# Patient Record
Sex: MALE | URBAN - METROPOLITAN AREA
[De-identification: names, ages, dates, MRNs, and addresses within clinical notes are randomized per-mention and may not be internally consistent; named-entity substitution may affect disease eponyms.]

---

## 2022-02-09 ENCOUNTER — ATHLETIC TRAINING (OUTPATIENT)
Dept: SPORTS MEDICINE | Facility: OTHER | Age: 22
End: 2022-02-09

## 2022-02-09 DIAGNOSIS — M25.332 WRIST JOINT INSTABILITY, LEFT: Primary | ICD-10-CM

## 2022-02-11 NOTE — PROGRESS NOTES
2/9  A: Left Wrist Instability  Pt asked to tape his Left wrist pre-practice and if it does not help he will seek out further care    LB ATC

## 2022-02-17 ENCOUNTER — ATHLETIC TRAINING (OUTPATIENT)
Dept: SPORTS MEDICINE | Facility: OTHER | Age: 22
End: 2022-02-17

## 2022-02-17 DIAGNOSIS — S50.11XA CONTUSION OF RIGHT FOREARM, INITIAL ENCOUNTER: Primary | ICD-10-CM

## 2022-02-17 DIAGNOSIS — S50.11XA CONTUSION OF ELBOW AND FOREARM, RIGHT, INITIAL ENCOUNTER: Primary | ICD-10-CM

## 2022-02-17 NOTE — PROGRESS NOTES
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 today with a c/o pain and swelling of their right forearm  Pt was told to report this morning to follow up after last nights practice  Pt states the RINA was a hit from stick to their right forearm proximal to the elbow  Pt states swelling has gone down from last night  Objective: Obvious discoloration and bruising noted  TTP proximal to the elbow  Pain with wrist flexion  Pt provided with a closed cell foam pad to help protect and absorb possible further hits to the area          Treatment Log       Date: 2/17/22   Playing Status As Tolerated, Limited contact   Tolerated Treatment Well       Exercise/Treatment    CUS 1 0 w/cm at [address]

## 2022-02-18 NOTE — PROGRESS NOTES
AT Daily Progress Note    Subjective: Ath reports to the clinic after practice to receive some treatment for his R forearm  He states that he did not do much but was given a pad for protection during practice  Objective: There is significantly less swelling over this forearm  He is still TTP over the bump  Treatment/Rehab Performed:    PUS 0 6W cm2 6min 3MHz    50x flexion and extension                        Plan: He was given a compression wrap to wear the remainder of the day  He was also told to complete 2 sets of the flexion and extension tonight prior to going to bed with the wrap on  He was told to take the wrap off when going to bed and to have his roommate wrap it on tomorrow morning  He will also check in with one of the AT's tomorrow prior to practice to get it wrapped

## 2022-03-25 ENCOUNTER — ATHLETIC TRAINING (OUTPATIENT)
Dept: SPORTS MEDICINE | Facility: OTHER | Age: 22
End: 2022-03-25

## 2022-03-25 DIAGNOSIS — M75.21 BICEPS TENDINITIS OF RIGHT UPPER EXTREMITY: Primary | ICD-10-CM

## 2022-03-25 NOTE — PROGRESS NOTES
Athletic Training Initial Evaluation    Assessment/Diagnosis: Bicep Tendinitis    Subjective: Ath reports to the clinic today c/o R shoulder pain  He states that he was hit on the edge of his shoulder during the last game and it has been bothering him since  He denies hearing any pop/snap  He also denies any numbness or tingling into his arm or hand/fingers  He states that it has been bothering him recently to move his arm  Yesterday he was not able to, but today he is able to move his arm  This is his dominant arm  He denies any other injury to that shoulder  Objective:     VI: (+) swelling over the ACJ  Slight elevation of R shoulder  (-) discoloration, deformity  Palpation: TTP over the proximal bicep tendon  ROM: Slight discomfort with forward flexion and ABD  MMT: WNL  Forward flexion 4-/5  ST: (+) speeds  (-) labral Shara eldridge,     Treatment/Rehab Performed:    PUS 0 8W/cm2 at 20% for 6min                            Plan: He will practice as tolerated with a shoulder spica  He will make appointments to begin treatment/rehab

## 2022-03-27 ENCOUNTER — ATHLETIC TRAINING (OUTPATIENT)
Dept: SPORTS MEDICINE | Facility: OTHER | Age: 22
End: 2022-03-27

## 2022-03-27 DIAGNOSIS — M75.21 BICEPS TENDINITIS OF RIGHT UPPER EXTREMITY: Primary | ICD-10-CM

## 2022-03-27 NOTE — PROGRESS NOTES
AT Daily Progress Note    Assessment/Diagnosis: Bicep Tendinitis    Subjective: Ath reports to the clinic today to cont treatment for his R shoulder  He states that the pain is a little more today since he played a lot in the game yesterday  He states that the shoulder wrap neither helped nor hindered him  Objective: No new measures at this time  Treatment/Rehab Performed:    CUS 0 9W/cm2 for 6min    Cupping of bicep tendon and upper trap                        Plan: He states that he felt really loose after the cupping and the cupping on the bicep felt interesting  He will cont to make appointments for rehab and treatment

## 2022-03-30 ENCOUNTER — ATHLETIC TRAINING (OUTPATIENT)
Dept: SPORTS MEDICINE | Facility: OTHER | Age: 22
End: 2022-03-30

## 2022-03-30 DIAGNOSIS — M75.21 BICEPS TENDINITIS OF RIGHT UPPER EXTREMITY: Primary | ICD-10-CM

## 2022-03-30 NOTE — PROGRESS NOTES
Athletic Training Progress Note    Name: Reza Hawthorne  Age: 25 y o  Assessment/Plan:     Visit Diagnosis: Biceps tendinitis of right upper extremity [M75 21]    Treatment Plan: See treatment log below  Work on strengthening of the shoulder musculature  []  Follow-up PRN  []  Follow-up prior to next practice/game for re-evaluation  [x]  Daily treatment/rehab  Progress note expected weekly  Subjective: Ath reports to the clinic today for treatment prior to the game  He states that his shoulder feels better than yesterday  He states that CUS helps him  Objective: Still has visible bruising from cupping on the bicep tendon  Treatment Log:     Date: 3/30/22   Playing Status: As tolerated       Exercise/Treatment    CUS 0 6W/cm2 for 6min Completed                                              He will cont to make appointments for treatment and rehab

## 2022-03-31 ENCOUNTER — ATHLETIC TRAINING (OUTPATIENT)
Dept: SPORTS MEDICINE | Facility: OTHER | Age: 22
End: 2022-03-31

## 2022-03-31 DIAGNOSIS — M75.21 BICEPS TENDINITIS OF RIGHT UPPER EXTREMITY: Primary | ICD-10-CM

## 2022-03-31 NOTE — PROGRESS NOTES
Athletic Training Progress Note    Name: Khadijah Moncada  Age: 25 y o  Assessment/Plan:     Visit Diagnosis: Biceps tendinitis of right upper extremity [M75 21]    Treatment Plan: See below    []  Follow-up PRN  []  Follow-up prior to next practice/game for re-evaluation  [x]  Daily treatment/rehab  Progress note expected weekly  Subjective: He states that it has hurt in the morning but it got better as the day has gone on  He states that he believes what we have been doing is helpful    Objective: No new objective measures at this time    Treatment Log:     Date: 3/31/22 3/30/22   Playing Status: As tolerated As tolerated        Exercise/Treatment     CUS 0 9W/cm2 for 6min Completed Completed   IR and ER with black band  2x10    Forward flexion black band 2x10    Shoulder Extension black band 2x10    Wall clock green ball 3x both ways    Shoulder ABD w/ 10# 2x10                                He will cont to make appointments for treatment and rehab

## 2022-04-05 ENCOUNTER — ATHLETIC TRAINING (OUTPATIENT)
Dept: SPORTS MEDICINE | Facility: OTHER | Age: 22
End: 2022-04-05

## 2022-04-05 DIAGNOSIS — M75.21 BICEPS TENDINITIS OF RIGHT UPPER EXTREMITY: Primary | ICD-10-CM

## 2022-04-06 NOTE — PROGRESS NOTES
Athletic Training Progress Note    Name: Zenaida Jaffe  Age: 25 y o  Assessment/Plan:     Visit Diagnosis: Biceps tendinitis of right upper extremity [M75 21]    Treatment Plan:    []  Follow-up PRN  []  Follow-up prior to next practice/game for re-evaluation  [x]  Daily treatment/rehab  Progress note expected weekly  Subjective: States completing rehabilitation made his shoulder feel better during his practice and he thinks it is helping him  Objective:   No new objective measures      Treatment Log:     Date: 4/5/22       Playing Status: Full Go               Exercise/Treatment        CUS  5 mins       Shoulder Flexion  2x10       Shoulder Extension 2x10       External/Internal Rotation 2x10       Isometric Shoulder Abduction 2x10       Wall Clock  W/ green ball

## 2022-04-07 ENCOUNTER — ATHLETIC TRAINING (OUTPATIENT)
Dept: SPORTS MEDICINE | Facility: OTHER | Age: 22
End: 2022-04-07

## 2022-04-07 DIAGNOSIS — M75.21 BICEPS TENDINITIS OF RIGHT UPPER EXTREMITY: Primary | ICD-10-CM

## 2022-04-07 NOTE — PROGRESS NOTES
Athletic Training Progress Note    Name: Tavia Crum  Age: 25 y o  Assessment/Plan:     Visit Diagnosis: Biceps tendinitis of right upper extremity [M75 21]    Treatment Plan: See below    []  Follow-up PRN  []  Follow-up prior to next practice/game for re-evaluation  [x]  Daily treatment/rehab  Progress note expected weekly  Subjective: Ath reports today for cont treatment for his R shoulder  He states that it has been bothering him a little the past couple of days after playing in games and getting hit  He states that he believe the exercises and CUS do help    Objective: Has some ecchymosis from repetitive checks during practice and games    Treatment Log:     Date: 4/7/22 3/31/22 3/30/22   Playing Status: As tolerated As tolerated As tolerated         Exercise/Treatment      CUS 0 9W/cm2 for 6min completed Completed Completed   IR and ER with black band  - 2x10    Forward flexion black band - 2x10    Shoulder Extension black band - 2x10    Wall clock green ball 3x both ways (did black ball) 3x both ways    Shoulder ABD w/ 10#  2x10    Body blade in 90/90 3x30s     Body blade in IR 3x45s     Ball throw against trampoleen 2x15                    He will cont to make appointments to cont treatment/rehab

## 2022-04-11 ENCOUNTER — ATHLETIC TRAINING (OUTPATIENT)
Dept: SPORTS MEDICINE | Facility: OTHER | Age: 22
End: 2022-04-11

## 2022-04-11 DIAGNOSIS — M75.21 BICEPS TENDINITIS OF RIGHT UPPER EXTREMITY: Primary | ICD-10-CM

## 2022-04-11 NOTE — PROGRESS NOTES
Athletic Training Progress Note    Name: Eleuterio Dakins  Age: 25 y o  Assessment/Plan:     Visit Diagnosis: No primary diagnosis found  Treatment Plan: See below    []  Follow-up PRN  []  Follow-up prior to next practice/game for re-evaluation  [x]  Daily treatment/rehab  Progress note expected weekly  Subjective: Ath reports today for cont treatment for his R shoulder  He states that it has been bothering him a little the past couple of days after playing in games and getting hit  He states that he believe the exercises and CUS do help  Objective: Has some ecchymosis from repetitive checks during practice and games    Treatment Log:     Date: 4/11/22 4/7/22 3/31/22 3/30/22   Playing Status: Full go As tolerated As tolerated As tolerated          Exercise/Treatment       CUS 0 9W/cm2 for 6min completed completed Completed Completed   IR and ER with black band   - 2x10    Forward flexion black band  - 2x10    Shoulder Extension black band  - 2x10    Wall clock green ball 3x both ways (black) 3x both ways (did black ball) 3x both ways    Shoulder ABD w/ 10#   2x10    Body blade in 90/90 2x30 3x30s     Body blade in IR 2x30 3x45s     Ball throw against trampoleen  2x15                      He will cont to make appointments to cont treatment/rehab  He was told that it will take longer to completely heal since he is still actively playing

## 2022-04-12 ENCOUNTER — ATHLETIC TRAINING (OUTPATIENT)
Dept: SPORTS MEDICINE | Facility: OTHER | Age: 22
End: 2022-04-12

## 2022-04-12 DIAGNOSIS — M75.21 BICEPS TENDINITIS OF RIGHT UPPER EXTREMITY: Primary | ICD-10-CM

## 2022-04-12 NOTE — PROGRESS NOTES
Athletic Training Progress Note    Name: Adeline Finley  Age: 25 y o  Assessment/Plan:     Visit Diagnosis: Biceps tendinitis of right upper extremity [M75 21]    Treatment Plan: See below    []  Follow-up PRN  []  Follow-up prior to next practice/game for re-evaluation  [x]  Daily treatment/rehab  Progress note expected weekly  Subjective: Ath reports today for cont treatment for his R shoulder  He states that it has been bothering him when he wakes up in the morning but it continues to feel better each day  He states that he believe the exercises and CUS do help and he feels better once leaving the [address] [place] [address]  Objective: Has some ecchymosis from repetitive checks during practice and games    Treatment Log:     Date: 4/12/22 4/11/22 4/7/22 3/31/22 3/30/22   Playing Status: Full go Full go As tolerated As tolerated As tolerated           Exercise/Treatment        CUS 0 9W/cm2 for 6min completed 1 3W/cm2  completed completed Completed Completed   IR and ER with black band    - 2x10    Forward flexion black band   - 2x10    Shoulder Extension black band   - 2x10    Wall clock green ball 3x both ways (black) 3x both ways (black) 3x both ways (did black ball) 3x both ways    Shoulder ABD w/ 10#    2x10    Body blade in 90/90 2x30 2x30 3x30s     Body blade in IR 2x30 2x30 3x45s     Ball throw against trampoleen 2x10  2x15     Body blade d2 pattern 2x10                  He will cont to make appointments to cont treatment/rehab   He has no restrictions for practice    Hue Engel ATS    Reviewed by Rhoda Fischer, MS, LAT, ATC, EMT

## 2022-04-18 ENCOUNTER — ATHLETIC TRAINING (OUTPATIENT)
Dept: SPORTS MEDICINE | Facility: OTHER | Age: 22
End: 2022-04-18

## 2022-04-18 DIAGNOSIS — M75.21 BICEPS TENDINITIS OF RIGHT UPPER EXTREMITY: Primary | ICD-10-CM

## 2022-04-18 NOTE — PROGRESS NOTES
Athletic Training Progress Note    Name: Guille Swanson  Age: 25 y o  Assessment/Plan:     Visit Diagnosis: Biceps tendinitis of right upper extremity [M75 21]    Treatment Plan: See below    []  Follow-up PRN  []  Follow-up prior to next practice/game for re-evaluation  [x]  Daily treatment/rehab  Progress note expected weekly  Subjective: Ath reports today for cont treatment for his R shoulder  He states that it has been bothering him when he wakes up in the morning but it continues to feel better each day  He states that he believe the exercises and CUS do help and he feels better once leaving the [facility]  During break he took some time off  He states that when he did wall ball over break it flared up a little bit  Objective: Has some ecchymosis from repetitive checks during practice and games    Treatment Log:     Date: 4/18/22 4/12/22 4/11/22 4/7/22 3/31/22 3/30/22   Playing Status: Full go Full go Full go As tolerated As tolerated As tolerated            Exercise/Treatment         CUS 0 9W/cm2 for 6min Completed 1 4W/cm2 completed 1 3W/cm2  completed completed Completed Completed   IR and ER with black band     - 2x10    Forward flexion black band    - 2x10    Shoulder Extension black band    - 2x10    Wall clock green ball 3 ways both way 3x both ways (black) 3x both ways (black) 3x both ways (did black ball) 3x both ways    Shoulder ABD w/ 10#     2x10    Body blade in 90/90 2x30 2x30 2x30 3x30s     Body blade in IR 2x30 2x30 2x30 3x45s     Ball throw against trampoleen  2x10  2x15     Body blade d2 pattern 2x30 2x10                   He will cont to make appointments to cont treatment/rehab   He has no restrictions for practice

## 2022-04-22 ENCOUNTER — ATHLETIC TRAINING (OUTPATIENT)
Dept: SPORTS MEDICINE | Facility: OTHER | Age: 22
End: 2022-04-22

## 2022-04-22 DIAGNOSIS — M67.921 TENDINOPATHY OF RIGHT BICEPS TENDON: Primary | ICD-10-CM

## 2022-04-22 NOTE — PROGRESS NOTES
Athletic Training Progress Note    Name: Roger Sanders  Age: 25 y o  Assessment/Plan:     Visit Diagnosis: Tendinopathy of right biceps tendon [T88 694]    Treatment Plan: See below    []  Follow-up PRN  []  Follow-up prior to next practice/game for re-evaluation  [x]  Daily treatment/rehab  Progress note expected weekly  Subjective: Ath reports today for cont treatment for his R shoulder  He states that it feels better now that he has been coming in more often  He still has a little discomfort still but nothing that is bad  Objective: No new observations at this time    Treatment Log:     Date: 4/22/22 4/18/22 4/12/22 4/11/22 4/7/22 3/31/22 3/30/22   Playing Status: Full go Full go Full go Full go As tolerated As tolerated As tolerated             Exercise/Treatment          CUS 1 4W/cm2 for 6min completed Completed 1 4W/cm2 completed 1 3W/cm2  completed completed Completed Completed   IR and ER with black band      - 2x10    Forward flexion black band     - 2x10    Shoulder Extension black band     - 2x10    Wall clock black ball 3x each way 3 ways both way 3x both ways (black) 3x both ways (black) 3x both ways (did black ball) 3x both ways    Shoulder ABD w/ 10#      2x10    Body blade in 90/90 Completed 2x30 2x30 2x30 3x30s     Body blade in IR completed 2x30 2x30 2x30 3x45s     Ball throw against trampoleen   2x10  2x15     Body blade d2 pattern Completed 2x30 2x10       Blaze pods 3x            He will cont to make appointments to cont treatment/rehab   He has no restrictions for practice

## 2022-04-25 ENCOUNTER — ATHLETIC TRAINING (OUTPATIENT)
Dept: SPORTS MEDICINE | Facility: OTHER | Age: 22
End: 2022-04-25

## 2022-04-25 DIAGNOSIS — M67.921 TENDINOPATHY OF RIGHT BICEPS TENDON: Primary | ICD-10-CM

## 2022-04-25 NOTE — PROGRESS NOTES
Athletic Training Progress Note    Name: Radha Montana  Age: 25 y o  Assessment/Plan:     Visit Diagnosis: Tendinopathy of right biceps tendon [J21 705]    Treatment Plan: See below    []  Follow-up PRN  []  Follow-up prior to next practice/game for re-evaluation  [x]  Daily treatment/rehab  Progress note expected weekly  Subjective: Ath reports today for cont treatment for his R shoulder  He states that it feels better now that he has been coming in more often  He states that he feels that what we have been doing helps and says that it "hasn't gotten any worse "    Objective: No new observations at this time    Treatment Log:     Date: 4/25/22 4/22/22 4/18/22 4/12/22 4/11/22 4/7/22 3/31/22 3/30/22   Playing Status: Full go Full go Full go Full go Full go As tolerated As tolerated As tolerated              Exercise/Treatment           CUS 1 4W/cm2 for 6min Completed (0 9, 8min for pain) completed Completed 1 4W/cm2 completed 1 3W/cm2  completed completed Completed Completed   IR and ER with black band       - 2x10    Forward flexion black band 2x10     - 2x10    Shoulder Extension black band 2x10     - 2x10    Wall clock black ball  3x each way 3 ways both way 3x both ways (black) 3x both ways (black) 3x both ways (did black ball) 3x both ways    Shoulder ABD w/ 10#       2x10    Body blade in 90/90 completed Completed 2x30 2x30 2x30 3x30s     Body blade in IR completed completed 2x30 2x30 2x30 3x45s     Ball throw against trampoleen 2x15   2x10  2x15     Body blade d2 pattern completed Completed 2x30 2x10       Blaze pods 2x 3x         Horizontal ADD black band 2x10          Horizontal flexion at 90 w/ black band 2x10             He will cont to make appointments to cont treatment/rehab   He has no restrictions for practice

## 2022-04-28 ENCOUNTER — ATHLETIC TRAINING (OUTPATIENT)
Dept: SPORTS MEDICINE | Facility: OTHER | Age: 22
End: 2022-04-28

## 2022-04-28 DIAGNOSIS — S83.203A OTHER TEAR OF MENISCUS OF RIGHT KNEE AS CURRENT INJURY, UNSPECIFIED MENISCUS, INITIAL ENCOUNTER: Primary | ICD-10-CM

## 2022-04-29 ENCOUNTER — ATHLETIC TRAINING (OUTPATIENT)
Dept: SPORTS MEDICINE | Facility: OTHER | Age: 22
End: 2022-04-29

## 2022-04-29 DIAGNOSIS — S83.281D ACUTE LATERAL MENISCUS TEAR OF RIGHT KNEE, SUBSEQUENT ENCOUNTER: Primary | ICD-10-CM

## 2022-04-29 NOTE — PROGRESS NOTES
Athletic Training Knee Evaluation    Name: Luis Delacruz  Age: 25 y o    School District: [address] [place] [address] Road  Sport: [employer]  Date of Assessment: 4/28/2022    Assessment/Plan:     Visit Diagnosis: Other tear of meniscus of right knee as current injury, unspecified meniscus, initial encounter [I34 855X]    Treatment Plan: See below  Treat conservatively for 2 weeks and look for improvement  Work on [employer] and quad sets for extension  Avoid deep squatting until pain is tolerable  []  Follow-up PRN  []  Follow-up prior to next practice/game for re-evaluation  [x]  Daily treatment/rehab  Progress note expected weekly  Referral:     [x]  Not needed at this time/may follow up with team doctor next tuesday  []  Referred to:     [x]  Coaching staff notified  []  Parent/Guardian Notified    Subjective:    Date of Injury: 4/29/22    Injury occurred during:     []  Practice  [x]  Competition  []  Other:     Mechanism: Does not recall a specific RINA    Previous History: No PMHx noted at this time    Reported Symptoms:     [] Felt pop [] Grinding   [] Sandy Crumbly a pop [x] Pressure   [x] Pain with rest [] Burning   [x] Pain with activity [x] Weakness   [x] Pain with stairs [x] Loss of motion   [x] Sharp pain [] Clicking   [] Dull pain [] Snapping sensation   [] Radiating pain [] Locking   [] Felt give way       Objective:    Observation:     []  No observable findings compared bilaterally    [x] Swelling [] Genu recurvatum   [x] Effusion [] Genu valgum   [] Deformity [] Genu varus   [] Ecchymosis [] Patella reece   [x] Abnormal gait: walks with a limp [] Patella baja   [] Atrophy [] Squinting patellae   [] Muscle spasm [] Frog eye patellae     Palpation: TTP over the posterolateral joint line  TTP over the Semimembranosus and Semitendinosus   Also TTP in the popliteal fossa    Active Range of Motion: Lacking last 5 degrees of knee extension     Full  ROM Limited  ROM Pain  with  ROM No  Motion   Knee Flexion [] [] [x] []   Knee Extension [] [x] [x] []   Hip Flexion [x] [] [] []   Hip Extension [x] [] [] []   Hip Abduction [x] [] [] []   Hip Adduction [x] [] [] []   Dorsiflexion [x] [] [] []   Plantarflexion [x] [] [] []     Manual Muscle Tests:      Not performed []             5 4+ 4 4- 3 or  Under   Knee Flexion [] [x] [] [] []   Knee Extension [] [] [x] [] []   Hip Flexion [] [] [] [] []   Hip Extension [] [] [] [] []   Hip Abduction [] [] [] [] []   Hip Adduction [] [] [] [] []   Dorsiflexion [] [] [] [] []   Plantarflexion [] [] [] [] []     Special Tests:      (+)  Laxity (+)  Pain (-)  WNL Not  Tested   Lachman [] [] [x] []   Anterior Drawer [] [] [x] []   Pivot Shift [] [] [] []   Posterior Drawer [] [] [x] []   Sag [] [] [] []   Valgus (0 Degrees) [] [] [x] []   Valgus (30 Degrees) [] [] [x] []   Varus (0 Degrees) [] [] [x] []   Varus (30 Degrees) [] [] [x] []   Bianka [] [] [] []   Thessally's [] [x] [] []   Apley's [] [x] [] []   Jeferson's [] [] [] []   Patellar Apprehension [] [] [] []   Patellar Glide [] [] [] []   Ballotable Patella  [] [] [] []     Treatment Log:     Date: 4/28/22   Playing Status: Out       Exercise/Treatment    Ice bag completed                                              Ath will be held from practice  Talked about treating conservatively at first and if no improvement then discuss seeing the team physician for other options  Was told to prepare to not play in the game on Saturday due to his pain level  He was given a compression sleeve and told to wear it during the day and to take it off to sleep and to shower  He was also given tylenol for the pain as he only had ibuprofen  He was educated on the importance of the inflammation process and to not take any NSAIDs  He will make an appointment for tomorrow to begin rehab

## 2022-04-29 NOTE — PROGRESS NOTES
Athletic Training Progress Note    Name: Abdirahman Stone  Age: 25 y o  Assessment/Plan:     Visit Diagnosis: Acute lateral meniscus tear of right knee, subsequent encounter [G46 553E]    Treatment Plan: See below  Began ROM exercises today  Cont to progress as tolerated  []  Follow-up PRN  []  Follow-up prior to next practice/game for re-evaluation  [x]  Daily treatment/rehab  Progress note expected weekly  Subjective: Ath reports to the clinic today to begin rehab for his R knee  He states that the pain is less than it was yesterday but it still bothers him  He states that it has clicked a few times since yesterdays appointments  Objective: He is still TTP to the posterolateral joint line and posterior horn of the meniscus  (+) Felyeys for pain in flexion, Valgus stress test for my hand placement on the lateral aspect of the knee  Treatment Log:     Date: 4/29/22 4/28/22   Playing Status: Out Out        Exercise/Treatment     Ice bag  completed   PUS, 5min, 20%, 0 5 in popliteal fossa     Quad sets 50x    Heels slides 50x    Thermx 10min                                          He states that the heel slides bother him when he goes to straighten his knee  He will cont to make appointments for rehab

## 2022-05-01 ENCOUNTER — ATHLETIC TRAINING (OUTPATIENT)
Dept: SPORTS MEDICINE | Facility: OTHER | Age: 22
End: 2022-05-01

## 2022-05-01 DIAGNOSIS — S83.281D ACUTE LATERAL MENISCUS TEAR OF RIGHT KNEE, SUBSEQUENT ENCOUNTER: Primary | ICD-10-CM

## 2022-05-01 NOTE — PROGRESS NOTES
Athletic Training Progress Note    Name: Pavan Helton  Age: 25 y o  Assessment/Plan:     Visit Diagnosis: Acute lateral meniscus tear of right knee, subsequent encounter [Q24 536I]    Treatment Plan: See below  Began ROM exercises today  Cont to progress as tolerated  []  Follow-up PRN  []  Follow-up prior to next practice/game for re-evaluation  [x]  Daily treatment/rehab  Progress note expected weekly  Subjective: Ath reports to the clinic today to begin rehab for his R knee  He went to the game yesterday and stood ofr the majority of the time  He states that today he feels even better  He states that going down stairs is bothering him, but not like it has  He states that he uses the crutches 75% of the time when he is going around campus  Objective: No new objective measures at this time  Treatment Log:     Date: 5/1/22 4/29/22 4/28/22   Playing Status: Out Out Out         Exercise/Treatment      Ice bag   completed   PUS, 5min, 20%, 0 5 in popliteal fossa      Quad sets 25x 50x    Heels slides 25x 50x    Thermx completed 10min    CUS, 1 0, 3MHz, 7min completed     Weight shifts 25x     Laps around clinic 6x                                He states that the heel slides bother him when he goes to straighten his knee  He will cont to make appointments for rehab

## 2022-05-02 ENCOUNTER — ATHLETIC TRAINING (OUTPATIENT)
Dept: SPORTS MEDICINE | Facility: OTHER | Age: 22
End: 2022-05-02

## 2022-05-02 DIAGNOSIS — S83.281D ACUTE LATERAL MENISCUS TEAR OF RIGHT KNEE, SUBSEQUENT ENCOUNTER: Primary | ICD-10-CM

## 2022-05-02 NOTE — PROGRESS NOTES
Athletic Training Progress Note    Name: Robby Botello  Age: 25 y o  Assessment/Plan:     Visit Diagnosis: Acute lateral meniscus tear of right knee, subsequent encounter [J70 875D]    Treatment Plan: See below  Began ROM exercises today  Cont to progress as tolerated  []  Follow-up PRN  []  Follow-up prior to next practice/game for re-evaluation  [x]  Daily treatment/rehab  Progress note expected weekly  Subjective: Ath reports to the clinic today to begin rehab for his R knee  He states that today it feels a lot better and he is able to walk on it  He states that he is able to extend it fully without any real pain  He states that when he goes to pull it out of extension it bothers him a little bit  He states that he feels overall weak  Objective: Appears to have full ROM    Treatment Log:     Date: 5/2/22 5/1/22 4/29/22 4/28/22   Playing Status: Out Out Out Out          Exercise/Treatment       Ice bag    completed   PUS, 5min, 20%, 0 5 in popliteal fossa       Quad sets  25x 50x    Heels slides  25x 50x    Thermx  completed 10min    CUS, 1 0, 3MHz, 7min  completed     Weight shifts  25x     Laps around clinic  6x     Bike 10min      BFR mini squats, LAQ, SLR 30, 15, 15, 15 with 30s break after full set      SLB 2x30s                During the bike, he states that pulling up bothered him when he was pulling up on the peddle  He will cont to make appointments to cont rehab and treatment

## 2022-05-03 ENCOUNTER — ATHLETIC TRAINING (OUTPATIENT)
Dept: SPORTS MEDICINE | Facility: OTHER | Age: 22
End: 2022-05-03

## 2022-05-03 DIAGNOSIS — S83.281D ACUTE LATERAL MENISCUS TEAR OF RIGHT KNEE, SUBSEQUENT ENCOUNTER: Primary | ICD-10-CM

## 2022-05-03 NOTE — PROGRESS NOTES
Athletic Training Progress Note    Name: Yahaira Seymour  Age: 25 y o  Assessment/Plan:     Visit Diagnosis: Acute lateral meniscus tear of right knee, subsequent encounter [R60 677G]    Treatment Plan: See below  Began more functional activity  []  Follow-up PRN  []  Follow-up prior to next practice/game for re-evaluation  [x]  Daily treatment/rehab  Progress note expected weekly  Subjective: Ath reports to the clinic today to begin rehab for his R knee  He states that he is getting better each day  Objective: Appears to have full ROM    Treatment Log:     Date: 5/3/22 5/2/22 5/1/22 4/29/22 4/28/22   Playing Status: Out Out Out Out Out           Exercise/Treatment        Ice bag     completed   PUS, 5min, 20%, 0 5 in popliteal fossa        Quad sets   25x 50x    Heels slides   25x 50x    Thermx   completed 10min    CUS, 1 0, 3MHz, 7min   completed     Weight shifts   25x     Laps around clinic   6x     Bike 10min 10min      BFR Completed squat, lateral slides, bridges (reps as before) 30, 15, 15, 15 with 30s break after full set      SLB 2x45s on blue pad 2x30s                 Saw the doctor today and stated that since he has made good progress to cont with the rehab plan   He will cont to make appointments

## 2022-05-06 ENCOUNTER — ATHLETIC TRAINING (OUTPATIENT)
Dept: SPORTS MEDICINE | Facility: OTHER | Age: 22
End: 2022-05-06

## 2022-05-06 DIAGNOSIS — S89.91XD: Primary | ICD-10-CM

## 2022-05-06 NOTE — PROGRESS NOTES
Athletic Training Progress Note    Name: Laney Oh  Age: 25 y o  Assessment/Plan:     Visit Diagnosis: Knee joint injury, right, subsequent encounter [R37 [id number]]    Treatment Plan: See below  Began more functional activity  []  Follow-up PRN  []  Follow-up prior to next practice/game for re-evaluation  [x]  Daily treatment/rehab  Progress note expected weekly  Subjective: Ath reports to the clinic today to begin rehab for his R knee  He states that he is getting better each day  Objective: Appears to have full ROM    Treatment Log:     Date: 5/6/22 5/3/22 5/2/22 5/1/22 4/29/22 4/28/22   Playing Status: out Out Out Out Out Out            Exercise/Treatment         Ice bag      completed   PUS, 5min, 20%, 0 5 in popliteal fossa         Quad sets    25x 50x    Heels slides    25x 50x    Thermx    completed 10min    CUS, 1 0, 3MHz, 7min    completed     Weight shifts    25x     Laps around clinic    6x     Bike 10min 10min 10min      BFR  Completed squat, lateral slides, bridges (reps as before) 30, 15, 15, 15 with 30s break after full set      SLB  2x45s on blue pad 2x30s      Wall squats with 5s sit 2x8        Nordic hamstring curls 2x5        Normatec completed                    He was able to complete leg day yesterday without any issue but was really sore  The team AT will work on a HEP for him when he leaves for summer  He will make a few more appointments prior to leaving  Assess functionality at next appointment

## 2022-09-13 ENCOUNTER — ATHLETIC TRAINING (OUTPATIENT)
Dept: SPORTS MEDICINE | Facility: OTHER | Age: 22
End: 2022-09-13

## 2022-09-13 DIAGNOSIS — M25.561 RIGHT KNEE PAIN, UNSPECIFIED CHRONICITY: ICD-10-CM

## 2022-09-13 DIAGNOSIS — S76.011A STRAIN OF HIP ADDUCTOR MUSCLE, RIGHT, INITIAL ENCOUNTER: Primary | ICD-10-CM

## 2022-09-14 NOTE — PROGRESS NOTES
Athletic Training Initial Note    Name: Khadijah Moncada  Age: 25 y o  Assessment/Plan:     Visit Diagnosis: Strain of hip adductor muscle, right, initial encounter [S76 011A]    Treatment Plan: Work on strengthening and ROM, as well as pain modulation    []  Follow-up PRN  []  Follow-up prior to next practice/game for re-evaluation  [x]  Daily treatment/rehab  Progress note expected weekly  Subjective: Ath reports to the clinic to have his R knee checked out  He states that it has been bothering him for awhile, especially since getting back on campus  He states that he has increased the intensity and load of his workouts since he has been on campus  He also denies any specific RINA  He states that it hurts the most when he does a side lunge  He points directly to the inside of his knee  Objective:   TTP around the medial epicondyle of the femur  Discomfort with hip ABD  MMT showed 5/5 but pain with ABD  All special tests were negative  Treatment Log:     Date: 9/14/22   Playing Status: As tolerated       Exercise/Treatment    MHP 10min   Side lunge gentle stretch 2x10   Lateral lunges along line 5x   Slide board 2x30                                   He will cont to make appointments for rehab

## 2022-09-28 ENCOUNTER — ATHLETIC TRAINING (OUTPATIENT)
Dept: SPORTS MEDICINE | Facility: OTHER | Age: 22
End: 2022-09-28

## 2022-09-28 DIAGNOSIS — S80.01XA CONTUSION OF RIGHT KNEE, INITIAL ENCOUNTER: Primary | ICD-10-CM

## 2022-09-28 NOTE — PROGRESS NOTES
Athletic Training Knee Evaluation    Name: Rod Rios  Age: 25 y o    School District: [employer]   Sport: Soccer  Date of Assessment: 9/28/2022    Assessment/Plan:     Visit Diagnosis: Contusion of right knee, initial encounter [S80 01XA]    Treatment Plan: Pain modulation    [x]  Follow-up PRN  []  Follow-up prior to next practice/game for re-evaluation  []  Daily treatment/rehab  Progress note expected weekly       Referral:     [x]  Not needed at this time  []  Referred to:     [x]  Coaching staff notified  []  Parent/Guardian Notified    Subjective:    Date of Injury: 9/28/22    Injury occurred during:     [x]  Practice  []  Competition  []  Other:     Mechanism: contact with another player    Previous History: Meniscus injury    Reported Symptoms:     [] Felt pop [] Grinding   [] Dolores Angela a pop [] Pressure   [] Pain with rest [] Burning   [] Pain with activity [] Weakness   [x] Pain with stairs [] Loss of motion   [x] Sharp pain [] Clicking   [] Dull pain [] Snapping sensation   [] Radiating pain [] Locking   [] Felt give way       Objective:    Observation:     []  No observable findings compared bilaterally    [x] Swelling [] Genu recurvatum   [] Effusion [] Genu valgum   [] Deformity [] Genu varus   [] Ecchymosis [] Patella reece   [] Abnormal gait [] Patella baja   [] Atrophy [] Squinting patellae   [] Muscle spasm [] Frog eye patellae     Palpation: TTP over the lateral aspect of the tibia plateua    Active Range of Motion:      Full  ROM Limited  ROM Pain  with  ROM No  Motion   Knee Flexion [x] [] [x] []   Knee Extension [x] [] [] []   Hip Flexion [] [] [] []   Hip Extension [] [] [] []   Hip Abduction [] [] [] []   Hip Adduction [] [] [] []   Dorsiflexion [] [] [] []   Plantarflexion [] [] [] []     Manual Muscle Tests:     Not performed [x]             5 4+ 4 4- 3 or  Under   Knee Flexion [] [] [] [] []   Knee Extension [] [] [] [] []   Hip Flexion [] [] [] [] []   Hip Extension [] [] [] [] []   Hip Abduction [] [] [] [] []   Hip Adduction [] [] [] [] []   Dorsiflexion [] [] [] [] []   Plantarflexion [] [] [] [] []     Special Tests:      (+)  Laxity (+)  Pain (-)  WNL Not  Tested   Lachman [] [] [x] []   Anterior Drawer [] [] [x] []   Pivot Shift [] [] [] []   Posterior Drawer [] [] [x] []   Sag [] [] [] []   Valgus (0 Degrees) [] [] [x] []   Valgus (30 Degrees) [] [] [x] []   Varus (0 Degrees) [] [] [x] []   Varus (30 Degrees) [] [] [x] []   Bianka [] [] [x] []   Thessjudah's [] [] [] []   Apley's [] [] [] []   Jeferson's [] [] [] []   Patellar Apprehension [] [] [] []   Patellar Glide [] [] [] []   Ballotable Patella  [] [] [] []     Treatment Log:      He will cont to ice and follow up prn

## 2022-11-28 ENCOUNTER — ATHLETIC TRAINING (OUTPATIENT)
Dept: SPORTS MEDICINE | Facility: OTHER | Age: 22
End: 2022-11-28

## 2022-11-28 DIAGNOSIS — M62.838 MUSCLE SPASM OF RIGHT SHOULDER: Primary | ICD-10-CM

## 2022-11-28 NOTE — PROGRESS NOTES
Athletic Training Shoulder Evaluation    Name: Roger Sanders  Age: 23 y o    School District: [address] [place] [address] Road  Sport: René Vivar  Date of Assessment: 11/28/2022    Assessment/Plan:     Visit Diagnosis: Muscle spasm of right shoulder [M62 838]    Treatment Plan: Work on muscle spasm    [x]  Follow-up PRN  []  Follow-up prior to next practice/game for re-evaluation  []  Daily treatment/rehab  Progress note expected weekly  Referral:     [x]  Not needed at this time  []  Referred to:     []  Coaching staff notified  []  Parent/Guardian Notified    Subjective:    Date of Injury:  11/23/22    Injury occurred during:     []  Practice  []  Competition  [x]  Other: [employer] game    Mechanism: Tackled from behind  Previous History: bicep tendinitis    Reported Symptoms:     [] Felt/heard a pop [] Pressure   [] Pain with rest [] Locking   [] Pain with activity [] Burning   [] Pain with overhead activity [] Weakness   [] Paresthesia [] Loss of motion   [] Sharp pain [] Crepitus   [x] Dull pain [] Clicking   [] Radiating pain [] Popping sensation   [] Felt give way [] Snapping sensation   [] [address] [] Cervical pain     Objective:    Observation:     []  No observable findings compared bilaterally    [] Swelling [] Asymmetry (in motion)   [] Ecchymosis [] Winged scapula   [] Deformity [] Scapular dyskinesis   [] Atrophy [] Uneven shoulders   [x] Muscle spasm [] Spine curvature     Palpation: Spasm over the supraspinatus and upper trap region      Active Range of Motion:      Full  ROM Limited  ROM Pain  with  ROM No  Motion   Shoulder Flexion [x] [] [] []   Shoulder Extension [x] [] [] []   Shoulder Abduction [x] [] [] []   Shoulder Adduction [x] [] [] []   Horizontal Abduction [x] [] [] []   Horizontal Adduction [x] [] [] []   Internal Rotation  [x] [] [] []   Internal Rotation  [x] [] [] []   Scapular Retraction  [] [] [] []   Scapular Protraction [] [] [] []     Manual Muscle Tests:     Not performed []             5 4+ 4 4- 3 or  Under   Shoulder Flexion [x] [] [] [] []   Shoulder Extension [x] [] [] [] []   Shoulder Abduction [x] [] [] [] []   Shoulder Adduction [x] [] [] [] []   Horizontal Abduction [x] [] [] [] []   Horizontal Adduction [x] [] [] [] []   Internal Rotation  [x] [] [] [] []   Internal Rotation  [x] [] [] [] []     Special Tests:      (+)  POS (-)  NEG Not  Tested   Anterior Apprehension [] [x] []   Relocation [] [] []   Posterior Apprehension [] [x] []   Anterior Load & Shift [] [x] []   AC Compression [] [x] []   Sulcus Sign [] [] []   Clunk [] [] []   Crank [] [] []   Drop Arm [] [x] []   Empty Can [] [] []   Pato's [] [] []   Speed's [] [x] []   Ovidio's [] [x] []   Neer's [] [] []   [place]'s [] [] []   Ghassan's [] [] []   Jw's [] [] []     Treatment Log:     Date: 11/28/22   Playing Status: As tolerated       Exercise/Treatment    MHP 10mi   Cupping completed                                          He was told that he has a combination of spasm and DOM's  He was told to wait another week to see how it feels as it should resolve on its own  He was told to check-in with an AthleticTrainer early next week if it has not resolved

## 2023-01-16 ENCOUNTER — ATHLETIC TRAINING (OUTPATIENT)
Dept: SPORTS MEDICINE | Facility: OTHER | Age: 23
End: 2023-01-16

## 2023-01-16 DIAGNOSIS — M25.511 CHRONIC RIGHT SHOULDER PAIN: ICD-10-CM

## 2023-01-16 DIAGNOSIS — G89.29 CHRONIC RIGHT SHOULDER PAIN: ICD-10-CM

## 2023-01-16 DIAGNOSIS — M75.81 PECTORALIS MAJOR TENDINITIS, RIGHT: Primary | ICD-10-CM

## 2023-01-16 NOTE — PROGRESS NOTES
Athletic Training Progress Note    Name: Maddy Sandhu  Age: 23 y o  Assessment/Plan:     Visit Diagnosis: Pectoralis major tendinitis, right [M75 81]    Treatment Plan: Cont to work on soft tissue mobilizations and strengthening 2x/week  []  Follow-up PRN  []  Follow-up prior to next practice/game for re-evaluation  [x]  Daily treatment/rehab  Progress note expected weekly  Subjective: Ath reports to the clinic today for this right shoulder  He states that it has been bothering him for awhile now and has not gotten any better really  He states that he went to see a PT at his gym and he stated that it is some Pec Major tendinitis  Objective:   Muscle tightness noted over the pec major muscle belly and TTP over the distal tendon near the intertubercular groove  Treatment Log:     Date: 1/16/23   Playing Status: Full go       Exercise/Treatment    MHP completed   Cupping of pec and upper trap completed   Doorway stretch 3x45s   Head tilt stretch 3x45s                                  He will cont to make more appointments

## 2023-01-18 ENCOUNTER — ATHLETIC TRAINING (OUTPATIENT)
Dept: SPORTS MEDICINE | Facility: OTHER | Age: 23
End: 2023-01-18

## 2023-01-18 DIAGNOSIS — M75.81 PECTORALIS MAJOR TENDINITIS, RIGHT: Primary | ICD-10-CM

## 2023-01-18 DIAGNOSIS — G89.29 CHRONIC RIGHT SHOULDER PAIN: ICD-10-CM

## 2023-01-18 DIAGNOSIS — M25.511 CHRONIC RIGHT SHOULDER PAIN: ICD-10-CM

## 2023-01-18 NOTE — PROGRESS NOTES
Athletic Training Progress Note    Name: Mckenzie Chan  Age: 23 y o  Assessment/Plan:     Visit Diagnosis: Pectoralis major tendinitis, right [M75 81]    Treatment Plan: Cont to work on soft tissue mobilizations and strengthening 2x/week  []  Follow-up PRN  []  Follow-up prior to next practice/game for re-evaluation  [x]  Daily treatment/rehab  Progress note expected weekly  Subjective:  Pt feels better after Monday's appointment  Objective: no new objectives      Treatment Log:     Date: 1/18/23 1/16/23   Playing Status: Full Go Full go        Exercise/Treatment     MHP completed completed   Cupping of pec and upper trap  completed   Doorway stretch 3x45s 3x45s   Head tilt stretch 3x45s 3x45s   Black band IR/ER 3x10    Black band forward flexion 3x10                                He will cont to make more appointments

## 2023-01-31 ENCOUNTER — ATHLETIC TRAINING (OUTPATIENT)
Dept: SPORTS MEDICINE | Facility: OTHER | Age: 23
End: 2023-01-31

## 2023-01-31 DIAGNOSIS — M75.81 PECTORALIS MAJOR TENDINITIS, RIGHT: Primary | ICD-10-CM

## 2023-01-31 DIAGNOSIS — M25.511 CHRONIC RIGHT SHOULDER PAIN: ICD-10-CM

## 2023-01-31 DIAGNOSIS — G89.29 CHRONIC RIGHT SHOULDER PAIN: ICD-10-CM

## 2023-02-01 NOTE — PROGRESS NOTES
Athletic Training Progress Note    Name: Keyona Weems  Age: 23 y o  Assessment/Plan:     Visit Diagnosis: Pectoralis major tendinitis, right [M75 81]    Treatment Plan: Cont to work on soft tissue mobilizations and strengthening 2x/week  []  Follow-up PRN  []  Follow-up prior to next practice/game for re-evaluation  [x]  Daily treatment/rehab  Progress note expected weekly  Subjective:  Pt reports to the clinic today to cont work on his shoulder  He states that he feels that he is getting a little better but he is more annoyed that this is still going on  He states that he does feel that treatment has been helping  Objective: no new objectives    Treatment Log:     Date: 1/31/23 1/18/23 1/16/23   Playing Status: Full go Full Go Full go         Exercise/Treatment      MHP completed completed completed   Cupping of pec and upper trap completed  completed   Doorway stretch 3x45s 3x45s 3x45s   Head tilt stretch 3x45s 3x45s 3x45s   Black band IR/ER 3x10 3x10    Black band forward flexion 3x10 3x10                                     He was given a K-tape to see if it helps  He will cont to make appointments

## 2023-02-02 ENCOUNTER — ATHLETIC TRAINING (OUTPATIENT)
Dept: SPORTS MEDICINE | Facility: OTHER | Age: 23
End: 2023-02-02

## 2023-02-02 DIAGNOSIS — M25.511 CHRONIC RIGHT SHOULDER PAIN: ICD-10-CM

## 2023-02-02 DIAGNOSIS — G89.29 CHRONIC RIGHT SHOULDER PAIN: ICD-10-CM

## 2023-02-02 DIAGNOSIS — M75.81 PECTORALIS MAJOR TENDINITIS, RIGHT: Primary | ICD-10-CM

## 2023-02-03 NOTE — PROGRESS NOTES
Athletic Training Progress Note    Name: Keyona Weems  Age: 23 y o  Assessment/Plan:     Visit Diagnosis: Pectoralis major tendinitis, right [M75 81]    Treatment Plan: Cont to work on soft tissue mobilizations and strengthening 2x/week  []  Follow-up PRN  []  Follow-up prior to next practice/game for re-evaluation  [x]  Daily treatment/rehab  Progress note expected weekly  Subjective:  Pt reports to the clinic today to cont work on his shoulder  He states that he feels that he is getting a little better but he is more annoyed that this is still going on  He states that he does feel that treatment has been helping  Objective: no new objectives    Treatment Log:     Date: 2/2/23 1/31/23 1/18/23 1/16/23   Playing Status: Full go Full go Full Go Full go          Exercise/Treatment       MHP completed completed completed completed   Cupping of pec and upper trap  completed  completed   Doorway stretch 3x45s 3x45s 3x45s 3x45s   Head tilt stretch 3x45s 3x45s 3x45s 3x45s   Black band IR/ER 3x10 3x10 3x10    Black band forward flexion  3x10 3x10    Body blade D2 3x10      Wall clocks 5x each way blue ball                              He will cont to make appointments

## 2023-02-15 ENCOUNTER — ATHLETIC TRAINING (OUTPATIENT)
Dept: SPORTS MEDICINE | Facility: OTHER | Age: 23
End: 2023-02-15

## 2023-02-15 DIAGNOSIS — G89.29 CHRONIC RIGHT SHOULDER PAIN: ICD-10-CM

## 2023-02-15 DIAGNOSIS — M75.81 PECTORALIS MAJOR TENDINITIS, RIGHT: Primary | ICD-10-CM

## 2023-02-15 DIAGNOSIS — M25.511 CHRONIC RIGHT SHOULDER PAIN: ICD-10-CM

## 2023-02-15 NOTE — PROGRESS NOTES
Athletic Training Progress Note    Name: Murali Colin  Age: 23 y o  Assessment/Plan:     Visit Diagnosis: Pectoralis major tendinitis, right [M75 81]    Treatment Plan: Cont to work on soft tissue mobilizations and strengthening 2x/week  [x]  Follow-up PRN  []  Follow-up prior to next practice/game for re-evaluation  [x]  Daily treatment/rehab  Progress note expected weekly  Subjective:  Pt reports to the clinic today to cont work on his shoulder  He states that he feels that he is getting better  Objective: no new objectives    Treatment Log:     Date:  2/2/23 1/31/23 1/18/23 1/16/23   Playing Status:  Full go Full go Full Go Full go           Exercise/Treatment        MHP completed completed completed completed completed   Cupping of pec and upper trap completed  completed  completed   Doorway stretch  3x45s 3x45s 3x45s 3x45s   Head tilt stretch  3x45s 3x45s 3x45s 3x45s   Black band IR/ER 3x10 3x10 3x10 3x10    Black band forward flexion   3x10 3x10    Body blade D2  3x10      Wall clocks 5x both ways blue ball 5x each way blue ball      Horizontal ADD 3x10 black band                          He will cont to make appointments prn  non-distended

## 2023-02-16 ENCOUNTER — ATHLETIC TRAINING (OUTPATIENT)
Dept: SPORTS MEDICINE | Facility: OTHER | Age: 23
End: 2023-02-16

## 2023-02-16 DIAGNOSIS — G44.319 ACUTE POST-TRAUMATIC HEADACHE, NOT INTRACTABLE: Primary | ICD-10-CM

## 2023-02-16 NOTE — PROGRESS NOTES
Athletic Training Head Injury Evaluation     Name: Javier Donis  Age: 23 y o  Assessment/Plan:     Visit Diagnosis: R/O Concussion     Treatment Plan: R/O Concussion    []  Follow-up PRN  []  Follow-up prior to next practice/game for re-evaluation  [x]  Daily treatment/rehab  Progress note expected weekly  Referral:      []  Not needed at this time  []  Will re-evaluate tomorrow to determine if referral is needed  []  Referred to:      [x]  Coaching staff notified  []  Parent/Guardian Notified     Subjective:  Date of Assessment: 2/16/2023  Date of Injury: 2/15/23     History: One "Bad" concussion     How many diagnosed concussions has the athlete had in the past? 1        When was the most recent concussion? How long was the recovery from the most recent concussion? Has the athlete ever been: Yes No   Hospitalized for a head injury? [] []   Diagnosed/treated for headache disorder or migraines? [] []   Diagnosed with a learning disability/dyslexia? [] []   Diagnosed with ADD/ADHD [] []   Diagnosed with depression, anxiety, or other psychiatric disorder? [] []      Current medications? If yes, please list:   []  None  []  Yes:          Symptom Evaluation     0 = No Symptoms; 1 or 2 = Mild Symptoms; 3 or 4 = Moderate Symptoms, 5 or 6 = Severe Symptoms       (Insert Columns as needed for subsequent dates)  Date: 2/16/2023   Symptom Symptom Score   Headache 3    Pressure in head     Neck Pain     Nausea or vomiting     Dizziness     Blurred Vision     Balance Problems     Sensitivity to light     Sensitivity to noise     Feeling slowed down     Feeling like "in a fog"     Don't feel right     Difficulty concentrating     Difficulty remembering     Fatigue or low energy     Confusion     Drowsiness     More emotional     Irritability     Sadness     Nervous or Anxious     Trouble falling asleep (if applicable)     Total number of Symptoms (of 22): Symptom Severity Score (of 132):      Do your symptoms get worse with physical activity? Do your symptoms get worse with mental activity? If 100% is feeling perfectly normal, what percent of normal do you feel? If not 100%, why? Cognitive Screening     Orientation 0 1   What month is it? [] []   What is the date today? [] []   What is the day of the week? [] []   What year is it? [] []   What time is it right now? (Within 1 hour) [] []   Orientation Score   of 5      Immediate Memory                                                                                                   Score (of 5)  List 5 Word Lists Trial 1 Trial 2 Trial 3   [] Finger, Carly, [place], Lemon, Insect         [] Candle, Paper, Sugar, [place], Wagon         [] Baby, Money, Perfume, [place], Iron         [] Elbow, Apple, Carpet, Saddle, Bubble         [] [place], Arrow, Pepper, Brayan Duncans, Movie         [] Dollar, Honey, Mirror, Saddle, [place]         Immediate Memory Score   of 15      Concentration      Digits Backwards   [] [] [] Yes No 0/1   4-9-3 5-2-6 1-4-2 [] []     6-2-9 4-1-5 6-5-8 [] []    3-8-1-4 1-7-9-5 6-8-3-1 [] []     3-2-7-9 4-9-6-8 3-4-8-1 [] []    6-2-9-7-1 4-8-5-2-7 4-9-1-5-3 [] []     1-5-2-8-6 6-1-8-4-3 6-8-2-5-1 [] []    7-1-8-4-6-2 8-3-1-9-6-4 3-7-6-5-1-9 [] []     5-3-9-1-4-8 7-2-4-8-5-6 9-2-6-5-1-4 [] []    Digits Backwards Score   of 4   Months in Reverse Order  0 1   Dec-Nov-Oct-Sept-Aug-July-Jun-May-Apr-Mar-Feb-Jan [] []   Month Score   of 1   Concentration Total Score (Digits + Months)   of 5      Neurological Screen       Yes No   Can the patient read aloud (e g  symptom check-list) and follow instructions without difficulty? [] []   Does the patient have a full pain-free PASSIVE cervical spine movement? [] []   Without moving their head or neck, can the patient look side-to-side and up-and-down without double vision? [] []   Can the patient perform the finger nose coordination test normally?  [] []   Can the patient perform tandem gait normally? [] []      Balance Examination  Modified Balance Error Scoring System (mBESS) testing     Which foot was tested (i e  which is the non-dominant foot):  []  Left  []  Right     Testing surface (hard floor, field, etc ):     Footwear (shoes, barefoot, braces, tape, etc ):     Condition Errors   Double leg stance   of 10   Single leg stance (non-dominant foot)   of 10   Tandem stance (non-dominant foot at back)   of 10   Total Errors   of 30      Delayed Recall     Total number of words recalled accurately   of 5       Vestibular Ocular Motor Screen     Test/Symptoms Headache  (0-10) Dizziness  (0-10) Nausea  (0-10) Fogginess   (0-10)   Starting Symptoms (0-10)  3 0  0  0    Smooth Pursuits  3  0  0  0   Saccades - Horizontal  3         Saccades - Vertical  3         Convergence  3         VOR - Horizontal  5 2  3  3    VOR - Vertical  3  0  0  0   Visual Motion Sensitivity Test  3 0  0   0   Comments (if applicable):         Head Injury Protocol Treatment Log  (Insert Columns as needed for subsequent dates)  Date:  2/16/23   Current Step of Protocol:  Day 0         Exercise/Drills                                                                    2/16/23  Pt completed IMPACT Test on 2/16 and had 16 symptoms score but composite score were the same or better  VOMS was 7 cm convergence and VOR horizontal increases symptoms  Pt shared that his history include a concussion that was not diagnosed initially and the next day was worse after practice  Provided all of the information to pt and will prepare him to communicate with AT point person    ALFRED DUTTON

## 2023-02-20 ENCOUNTER — OFFICE VISIT (OUTPATIENT)
Dept: OBGYN CLINIC | Facility: CLINIC | Age: 23
End: 2023-02-20

## 2023-02-20 VITALS
HEART RATE: 60 BPM | BODY MASS INDEX: 25.11 KG/M2 | WEIGHT: 160 LBS | DIASTOLIC BLOOD PRESSURE: 80 MMHG | HEIGHT: 67 IN | SYSTOLIC BLOOD PRESSURE: 120 MMHG

## 2023-02-20 DIAGNOSIS — S06.0X0A CONCUSSION WITHOUT LOSS OF CONSCIOUSNESS, INITIAL ENCOUNTER: Primary | ICD-10-CM

## 2023-02-20 NOTE — PROGRESS NOTES
Chief Complaint: Head injury    HPI:       Patient ID:  Scooter Benítez is a 23 y o  male today for concussion evaluation      Pertinent PMHx  Reports h/o migraines - periocular (occurs 2-3 times a week)    School:  [address]  Related to: while playing lacrosse  School Status: Back in school full-time    Injury Description:  Date / Time:  2/15/2023  :  Patient  Injury Description: During lacrosse, patient was pushed from his left side and he landed on his back and his several aspect of his head bounced off the ground  He states he was slow to get up but did not lose consciousness  He states he did have a sense of lightheadedness  Evidence of forcible blow to the head:  no  Evidence of IC Injury / Fracture:  no  Location:  Occipital    Amnesia:   Retrograde:  no   Anterograde:  no   LOC:  no  Early Signs:  Blurred vision, Appeared confused and Headache  Seizures:  No  CT Scan:  No   History of Headaches at baseline: Yes  Patient states he has a history of periorbital migraines that he states occurs every 2-3 times a week  He states that the headaches that he was experiencing after his head injury were different than his migraines however  History of Concussion:  Yes  How many? 1, How long to recover? About a month and Last concussion?   When he was a ivet in high school    Headache History:  Yes:   Location:   Frontal and Reports pain/headaches localized to the frontal aspect between his eyebrows, Radiation:   None, Pain - quality:   Pressure, Onset mode:   Gradual, Timing:   Reports his last headache was 2 days ago, Current symptom frequency:   As stated before he states last headache was on 2/18/2023, Current symptom duration:   Lasted about an hour, Severity:   Mild, Progression:   Resolved, Exacerbating factors:   None, Relieving factors:   Rest and Non-opioid analgesics, Associated Symptoms:   None, Current treatment:   None and Symptom control:   Good  Developmental History: Reports he thinks he may have had ADHD but was never formally diagnosed as he reports mother and sibling have a history of ADHD  History of Sleep Disorder:  No  Psychiatric History: Reports he thinks he has anxiety but has never been formally diagnosed  Do symptoms worsen with Physical Activity? No  Do symptoms worsen with Cognitive Activity? No, reports he finished 2 papers over the weekend without any issue for school  Feels he can continue school activities without accommodations  Overall Rating:  What percent is this person back to normal?  Patient  %      The following portions of the patient's history were reviewed and updated as appropriate: allergies, current medications, past family history, past medical history, past social history, past surgical history and problem list     The current concussion symptom score is 0/22    Does patient have history of mood disorder or report significant mood associated symptoms? No    PHQ-A Screening          PHQ-2/9 Depression Screening    Little interest or pleasure in doing things: 0 - not at all  Feeling down, depressed, or hopeless: 0 - not at all  PHQ-2 Score: 0  PHQ-2 Interpretation: Negative depression screen              Symptoms Checklist    Flowsheet Row Most Recent Value   Physical    Headache 0   Nausea 0   Vomiting 0   Balance problems 0   Dizziness 0   Visual problems 0   Fatigue 0   Sensitivity to light 0   Sensitivity to noise 0   Numbness / tingling 0   TOTAL PHYSICAL SCORE 0   Cognitive    Foggy 0   Slowed down 0   Difficulty concentrating 0   Difficulty remembering 0   TOTAL COGNITIVE SCORE 0   Emotional    Irritability 0   Sadness 0   More emotional 0   Nervousness 0   TOTAL EMOTIONAL SCORE 0   Sleep    Drowsiness 0   Sleeping less 0   Sleeping more 0   Difficulty falling asleep 0   TOTAL SLEEP SCORE 0   TOTAL SYMPTOM SCORE 0            I have personally reviewed pertinent films in PACS  There is no problem list on file for this patient         No current outpatient medications on file prior to visit  No current facility-administered medications on file prior to visit  Not on File     Tobacco Use: Not on file        Social Determinants of Health     Tobacco Use: Not on file   Alcohol Use: Not on file   Financial Resource Strain: Not on file   Food Insecurity: Not on file   Transportation Needs: Not on file   Physical Activity: Not on file   Stress: Not on file   Social Connections: Not on file   Intimate Partner Violence: Not on file   Depression: Not at risk   • PHQ-2 Score: 0   Housing Stability: Not on file        Review of Systems     Body mass index is 25 06 kg/m²  Physical Exam     Physical Exam       /80   Pulse 60   Ht 5' 7" (1 702 m)   Wt 72 6 kg (160 lb)   BMI 25 06 kg/m²   General:   NAD:  Yes  Psych:   AAOX3:  Yes   Mood and Affect:  Normal  HEENT:   Lacerations:  No   Bruising:  No   PEERLA:  Yes     Neuro:   Examination of Coordination:  Normal   CNII - XII Intact:  Yes   FTN:  Normal   Accommodation:  5cm   Convergence:  <5cm    Vestibular Ocular:  Gaze stability:  Normal       Cervical Spine mid-line tenderness: No    ImPACT Neurocognitive Test Interpretation:  Date of testin2023  Place of testing: Closed testing room  Baseline test available and valid? Yes    Composite Score Percentile Value Comparable to baseline   Memory Composite (verbal) 93 Yes   Visual Motor Speed Composite: 43 72 Yes   Reaction Time Composite 0 57 Yes   Impulse control composite 9 Yes     Total Symptom Score: 16    Significant symptom worsening post-test ? No    Clinically correlated ImPACT neurocognitive scores appear comparable to baseline/ normative data? 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

## 2023-02-24 ENCOUNTER — ATHLETIC TRAINING (OUTPATIENT)
Dept: SPORTS MEDICINE | Facility: OTHER | Age: 23
End: 2023-02-24

## 2023-02-24 DIAGNOSIS — G44.319 ACUTE POST-TRAUMATIC HEADACHE, NOT INTRACTABLE: Primary | ICD-10-CM

## 2023-02-28 ENCOUNTER — ATHLETIC TRAINING (OUTPATIENT)
Dept: SPORTS MEDICINE | Facility: OTHER | Age: 23
End: 2023-02-28

## 2023-02-28 DIAGNOSIS — S06.0X0A CONCUSSION WITHOUT LOSS OF CONSCIOUSNESS, INITIAL ENCOUNTER: Primary | ICD-10-CM

## 2023-02-28 NOTE — PROGRESS NOTES
Athletic Training Head Injury Evaluation     Name: Virgen Pablo  Age: 23 y o  Assessment/Plan:     Visit Diagnosis: R/O Concussion     Treatment Plan: R/O Concussion    []  Follow-up PRN  []  Follow-up prior to next practice/game for re-evaluation  [x]  Daily treatment/rehab  Progress note expected weekly  Referral:      []  Not needed at this time  []  Will re-evaluate tomorrow to determine if referral is needed  []  Referred to:      [x]  Coaching staff notified  []  Parent/Guardian Notified     Subjective:  Date of Assessment: 2/28/2023  Date of Injury: 2/15/23     History: One "Bad" concussion     How many diagnosed concussions has the athlete had in the past? 1        When was the most recent concussion? How long was the recovery from the most recent concussion? Has the athlete ever been: Yes No   Hospitalized for a head injury? [] []   Diagnosed/treated for headache disorder or migraines? [] []   Diagnosed with a learning disability/dyslexia? [] []   Diagnosed with ADD/ADHD [] []   Diagnosed with depression, anxiety, or other psychiatric disorder? [] []      Current medications? If yes, please list:   []  None  []  Yes:          Symptom Evaluation     0 = No Symptoms; 1 or 2 = Mild Symptoms; 3 or 4 = Moderate Symptoms, 5 or 6 = Severe Symptoms       (Insert Columns as needed for subsequent dates)  Date: 2/28/2023   Symptom Symptom Score   Headache 3    Pressure in head     Neck Pain     Nausea or vomiting     Dizziness     Blurred Vision     Balance Problems     Sensitivity to light     Sensitivity to noise     Feeling slowed down     Feeling like "in a fog"     Don't feel right     Difficulty concentrating     Difficulty remembering     Fatigue or low energy     Confusion     Drowsiness     More emotional     Irritability     Sadness     Nervous or Anxious     Trouble falling asleep (if applicable)     Total number of Symptoms (of 22): Symptom Severity Score (of 132):      Do your symptoms get worse with physical activity? Do your symptoms get worse with mental activity? If 100% is feeling perfectly normal, what percent of normal do you feel? If not 100%, why? Cognitive Screening     Orientation 0 1   What month is it? [] []   What is the date today? [] []   What is the day of the week? [] []   What year is it? [] []   What time is it right now? (Within 1 hour) [] []   Orientation Score   of 5      Immediate Memory                                                                                                   Score (of 5)  List 5 Word Lists Trial 1 Trial 2 Trial 3   [] Finger, Carly, [place], Lemon, Insect         [] Candle, Paper, Sugar, [place], Wagon         [] Baby, Money, Perfume, Sunset, Iron         [] Elbow, Apple, Carpet, Saddle, Bubble         [] [place], Arrow, Pepper, [place], Movie         [] Dollar, Honey, Mirror, Saddle, Dobbins         Immediate Memory Score   of 15      Concentration      Digits Backwards   [] [] [] Yes No 0/1   4-9-3 5-2-6 1-4-2 [] []     6-2-9 4-1-5 6-5-8 [] []    3-8-1-4 1-7-9-5 6-8-3-1 [] []     3-2-7-9 4-9-6-8 3-4-8-1 [] []    6-2-9-7-1 4-8-5-2-7 4-9-1-5-3 [] []     1-5-2-8-6 6-1-8-4-3 6-8-2-5-1 [] []    7-1-8-4-6-2 8-3-1-9-6-4 3-7-6-5-1-9 [] []     5-3-9-1-4-8 7-2-4-8-5-6 9-2-6-5-1-4 [] []    Digits Backwards Score   of 4   Months in Reverse Order  0 1   Dec-Nov-Oct-Sept-Aug-July-Jun-May-Apr-Mar-Feb-Jan [] []   Month Score   of 1   Concentration Total Score (Digits + Months)   of 5      Neurological Screen       Yes No   Can the patient read aloud (e g  symptom check-list) and follow instructions without difficulty? [] []   Does the patient have a full pain-free PASSIVE cervical spine movement? [] []   Without moving their head or neck, can the patient look side-to-side and up-and-down without double vision? [] []   Can the patient perform the finger nose coordination test normally?  [] []   Can the patient perform tandem gait normally? [] []      Balance Examination  Modified Balance Error Scoring System (mBESS) testing     Which foot was tested (i e  which is the non-dominant foot):  []  Left  []  Right     Testing surface (hard floor, field, etc ):     Footwear (shoes, barefoot, braces, tape, etc ):     Condition Errors   Double leg stance   of 10   Single leg stance (non-dominant foot)   of 10   Tandem stance (non-dominant foot at back)   of 10   Total Errors   of 30      Delayed Recall     Total number of words recalled accurately   of 5       Vestibular Ocular Motor Screen     Test/Symptoms Headache  (0-10) Dizziness  (0-10) Nausea  (0-10) Fogginess   (0-10)   Starting Symptoms (0-10)  3 0  0  0    Smooth Pursuits  3  0  0  0   Saccades - Horizontal  3         Saccades - Vertical  3         Convergence  3         VOR - Horizontal  5 2  3  3    VOR - Vertical  3  0  0  0   Visual Motion Sensitivity Test  3 0  0   0   Comments (if applicable):         Head Injury Protocol Treatment Log  (Insert Columns as needed for subsequent dates)  Date:  2/16/23   Current Step of Protocol:  Day 0         Exercise/Drills                                                                   2/28  Pt return to play and participated in a game on 2/25  [facility]       2/16/23  Pt completed IMPACT Test on 2/16 and had 16 symptoms score but composite score were the same or better  VOMS was 7 cm convergence and VOR horizontal increases symptoms  Pt shared that his history include a concussion that was not diagnosed initially and the next day was worse after practice  Provided all of the information to pt and will prepare him to communicate with AT point person    [facility]

## 2023-03-15 ENCOUNTER — ATHLETIC TRAINING (OUTPATIENT)
Dept: SPORTS MEDICINE | Facility: OTHER | Age: 23
End: 2023-03-15

## 2023-03-15 DIAGNOSIS — S66.911A STRAIN OF RIGHT WRIST, INITIAL ENCOUNTER: Primary | ICD-10-CM

## 2023-03-15 NOTE — PROGRESS NOTES
Athletic Training Wrist/Hand Evaluation    Name: Florentin Gomez  Age: 23 y o    [address] District: [address] [place] [address] Road  Sport: Mens Lacrosse  Date of Assessment: 3/15/2023    Assessment/Plan:     Visit Diagnosis: Strain of right wrist, initial encounter [T34 927Q]    Treatment Plan: Decrease wrist pain    []  Follow-up PRN  []  Follow-up prior to next practice/game for re-evaluation  [x]  Daily treatment/rehab  Progress note expected weekly       Referral:     []  Not needed at this time  []  Referred to:     []  Coaching staff notified  []  Parent/Guardian Notified    Subjective:    Date of Injury: 2-3 weeks ago    Injury occurred during:     []  Practice  []  Competition  [x]  Other:  unknown    Mechanism: Does not remember    Previous History: No hx of wrist pain    Reported Symptoms:     [] Hyperextension [] Numbness or tingling   [] Hyperflexion [] Weakness   [] Snapping sensation [] Grinding   [] Felt pop [x] Sharp pain   [] Pain with rest [] Burning   [x] Pain with activity [] Dull or achy   [] Loss of motion       Objective:    Observation:     [x]  No observable findings compared bilaterally    [] Swelling [] Jersey finger   [] Ecchymosis [] Mallet finger   [] Atrophy [] Abnormal contours   [] Callous or blister [] Nail abnormality   [] Deformity [] Subungual hematoma   [] Boutonniere deformity [] Ingrown nail   [] Saline neck deformity [] Laceration     Palpation: TTP over proximal carpal bones    Active Range of Motion:      Full  ROM Limited  ROM Pain  with  ROM No  Motion   Wrist Flexion [] [] [x] []   Wrist Extension [] [] [x] []   Pronation [x] [] [] []   Supination [x] [] [] []   Radial Deviation [x] [] [] []   Ulnar Deviation [] [] [x] []   Thumb Flexion [] [] [] []   Thumb Extension [] [] [] []   Thumb Abduction [] [] [] []   Thumb Adduction [] [] [] []   MP Flexion [] [] [] []   MP Extension [] [] [] []   PIP Flexion [] [] [] []   PIP Extension [] [] [] []   DIP Flexion [] [] [] []   DIP Extension [] [] [] []     Manual Muscle Tests:     Not performed []             5 4+ 4 4- 3 or  Under   Wrist Flexion [x] [] [] [] []   Wrist Extension [x] [] [] [] []   Pronation [x] [] [] [] []   Supination [x] [] [] [] []   Radial Deviation [x] [] [] [] []   Ulnar Deviation [x] [] [] [] []   Thumb Flexion [] [] [] [] []   Thumb Extension [] [] [] [] []   Thumb Abduction [] [] [] [] []   Thumb Adduction [] [] [] [] []   MP Flexion [] [] [] [] []   MP Extension [] [] [] [] []   PIP Flexion [] [] [] [] []   PIP Extension [] [] [] [] []   DIP Flexion [] [] [] [] []   DIP Extension [] [] [] [] []     Special Tests:      (+)  Laxity (+)  Pain (-)  WNL Not  Tested   Compression [] [] [] []   Distraction [] [] [] []   Percussion [] [] [] []   Tuning Fork [] [] [] []   Valgus Stress [] [] [] []   Varus Stress [] [] [] []   Wrist Glide [] [] [] []   Tinel's [] [] [] []   Phalen's [] [] [x] []   Reverse Phalen's [] [] [x] []   Finkelstein's [] [] [x] []   Diop Scaphoid Shift [] [] [] []   Triangular Fibrocartilage [] [] [] []   Lunotriquetrial Shear [] [] [] []     Treatment Log:     Date: 3/15/23   Playing Status: Full Go       Exercise/Treatment    Wrist flexion, extension, ulnar and radial deviation 4lbs 3x10   Finger web 3x10                                          [facility]  [facility]

## 2023-03-19 ENCOUNTER — ATHLETIC TRAINING (OUTPATIENT)
Dept: SPORTS MEDICINE | Facility: OTHER | Age: 23
End: 2023-03-19

## 2023-03-19 DIAGNOSIS — M75.81 PECTORALIS MAJOR TENDINITIS, RIGHT: Primary | ICD-10-CM

## 2023-03-19 NOTE — PROGRESS NOTES
Ath has not been in for his shoulder in over 2 week  This episode is considered closed  A new episode will be created if the problem returns

## 2023-03-23 ENCOUNTER — ATHLETIC TRAINING (OUTPATIENT)
Dept: SPORTS MEDICINE | Facility: OTHER | Age: 23
End: 2023-03-23

## 2023-03-23 DIAGNOSIS — M75.81 PECTORALIS MAJOR TENDINITIS, RIGHT: Primary | ICD-10-CM

## 2023-03-23 DIAGNOSIS — M75.81 TENDINITIS OF RIGHT ROTATOR CUFF: ICD-10-CM

## 2023-03-23 NOTE — PROGRESS NOTES
Athletic Training Progress Note    Name: Mirna Duncan  Age: 23 y o  Assessment/Plan:     Visit Diagnosis: Pectoralis major tendinitis, right [M75 81]    Treatment Plan: See below  []  Follow-up PRN  []  Follow-up prior to next practice/game for re-evaluation  [x]  Daily treatment/rehab  Progress note expected weekly  Subjective: Ath is reporting being sore in his shoulder after his game on 3 22      Objective: A/PROM is eq b/l   Strength eq b/l     Treatment Log:     Date: 3/23/23   Playing Status: Full go        Exercise/Treatment    Vertical Band  3x15   Heat  5 min   Y motion w weight 3x15 3#   Internal rotation 90/90 w band 3x12 Red band    Eccentric isometric pulls  3x12 Red band   Radial deviation 3x8 #4   Ulnar deviation  3x8 4#   Hand web  8x    Ultrasound in water 5m   4 5cm 20%             Ath gets K-T taped on his left shoulder prior to participation    [facility]   [facility]

## 2023-05-04 ENCOUNTER — ATHLETIC TRAINING (OUTPATIENT)
Dept: SPORTS MEDICINE | Facility: OTHER | Age: 23
End: 2023-05-04

## 2023-05-04 DIAGNOSIS — S66.911D STRAIN OF RIGHT WRIST, SUBSEQUENT ENCOUNTER: Primary | ICD-10-CM

## 2023-05-04 DIAGNOSIS — M25.531 RIGHT WRIST PAIN: ICD-10-CM

## 2023-05-05 NOTE — PROGRESS NOTES
Athletic Training Progress Note    Name: Anne-Marie Goodrich  Age: 23 y o  Assessment/Plan:     Visit Diagnosis: Strain of right wrist, subsequent encounter [M54 065X]    Treatment Plan: See below    []  Follow-up PRN  []  Follow-up prior to next practice/game for re-evaluation  [x]  Daily treatment/rehab  Progress note expected weekly  Subjective: Ath reports to the clinic today work on his R wrist  He states that it has gotten better with rest but it is bothering him still  Objective:   See treatment log below    Treatment Log:     Date: 5/4/23   Playing Status: As tolerated       Exercise/Treatment    CUS, 1 5W/cm2, 6min, 3MHz completed   4-way wrist 3x10   Pronation/Supination golf club 3x10   Ball toss wrist flick 1V65                                  He will cont to make appointments

## 2023-09-04 ENCOUNTER — ATHLETIC TRAINING (OUTPATIENT)
Dept: SPORTS MEDICINE | Facility: OTHER | Age: 23
End: 2023-09-04

## 2023-09-04 DIAGNOSIS — M25.531 RIGHT WRIST PAIN: Primary | ICD-10-CM

## 2023-09-06 NOTE — PROGRESS NOTES
Athletic Training Wrist/Hand Evaluation    Name: Benita Pierre  Age: 23 y.o.   [address]: [address]  Sport: Alvar[employer] Oliveira  Date of Assessment: 9/4/2023    Assessment/Plan:     Visit Diagnosis: Right wrist pain [M25.531]    Treatment Plan: See below    []  Follow-up PRN. []  Follow-up prior to next practice/game for re-evaluation. [x]  Daily treatment/rehab. Progress note expected weekly. Referral:     [x]  Not needed at this time  []  Referred to:     []  Coaching staff notified  []  Parent/Guardian Notified    Subjective:    Date of Injury: Last year    Injury occurred during:     []  Practice  []  Competition  [x]  Other:     Mechanism: He checked someone with his stick    Previous History: This has been going on since last season.     Reported Symptoms:     [] Hyperextension [] Numbness or tingling   [] Hyperflexion [] Weakness   [] Snapping sensation [] Grinding   [] Felt pop [x] Sharp pain   [] Pain with rest [] Burning   [x] Pain with activity [] Dull or achy   [] Loss of motion       Objective:    Observation:     []  No observable findings compared bilaterally    [x] Swelling [] Jersey finger   [] Ecchymosis [] Mallet finger   [] Atrophy [] Abnormal contours   [] Callous or blister [] Nail abnormality   [] Deformity [] Subungual hematoma   [] Boutonniere deformity [] Ingrown nail   [] [place] neck deformity [] Laceration     Palpation: TTP over the wrist flexor tendons near the carpal tunnel    Active Range of Motion:      Full  ROM Limited  ROM Pain  with  ROM No  Motion   Wrist Flexion [x] [] [x] []   Wrist Extension [x] [] [] []   Pronation [x] [] [] []   Supination [x] [] [] []   Radial Deviation [x] [] [] []   Ulnar Deviation [x] [] [] []   Thumb Flexion [] [] [] []   Thumb Extension [] [] [] []   Thumb Abduction [] [] [] []   Thumb Adduction [] [] [] []   MP Flexion [] [] [] []   MP Extension [] [] [] []   PIP Flexion [] [] [] []   PIP Extension [] [] [] []   DIP Flexion [] [] [] [] DIP Extension [] [] [] []     Manual Muscle Tests:     Not performed [x]             5 4+ 4 4- 3 or  Under   Wrist Flexion [] [] [] [] []   Wrist Extension [] [] [] [] []   Pronation [] [] [] [] []   Supination [] [] [] [] []   Radial Deviation [] [] [] [] []   Ulnar Deviation [] [] [] [] []   Thumb Flexion [] [] [] [] []   Thumb Extension [] [] [] [] []   Thumb Abduction [] [] [] [] []   Thumb Adduction [] [] [] [] []   MP Flexion [] [] [] [] []   MP Extension [] [] [] [] []   PIP Flexion [] [] [] [] []   PIP Extension [] [] [] [] []   DIP Flexion [] [] [] [] []   DIP Extension [] [] [] [] []     Special Tests:      (+)  Laxity (+)  Pain (-)  WNL Not  Tested   Compression [] [] [] []   Distraction [] [] [] []   Percussion [] [] [] []   Tuning Fork [] [] [] []   Valgus Stress [] [] [] []   Varus Stress [] [] [] []   Wrist Glide [] [] [] []   Tinel's [] [] [] []   Phalen's [] [] [] []   Reverse Phalen's [] [] [] []   Finkelstein's [] [] [] []   Diop Scaphoid Shift [] [] [] []   Triangular Fibrocartilage [] [] [] []   Lunotriquetrial Shear [] [] [] []     Treatment Log:     Date: 9/4/23   Playing Status: As tolerated       Exercise/Treatment    CUS, 1.5W/cm2, 3MHz, 7min completed   IASTM completed                                          We will cont to treat conservatively for 2 weeks. Will be treated as a tendinosis. He will cont to make appointments.

## 2023-09-08 ENCOUNTER — ATHLETIC TRAINING (OUTPATIENT)
Dept: SPORTS MEDICINE | Facility: OTHER | Age: 23
End: 2023-09-08

## 2023-09-08 DIAGNOSIS — M25.531 RIGHT WRIST PAIN: Primary | ICD-10-CM

## 2023-09-08 NOTE — PROGRESS NOTES
Athletic Training Progress Note    Name: Francheska Cash  Age: 23 y.o. Assessment/Plan:     Visit Diagnosis: Right wrist pain [M25.531]    Treatment Plan: See below. []  Follow-up PRN. []  Follow-up prior to next practice/game for re-evaluation. [x]  Daily treatment/rehab. Progress note expected weekly. Subjective: Ath reports to the clinic today to work on his wrist. He states that the IASTM helped with the pain after the last appointment. He states that he does not have as much pain ever since receiving that treatment. He states that he still does have some discomfort in his wrist.    Objective:   n/a    Treatment Log:     Date: 9/8/23 9/4/23   Playing Status: As tolerated As tolerated        Exercise/Treatment     CUS, 1.5W/cm2, 3MHz, 7min completed completed   IASTM completed completed   4-way wrist 4# 3x10    Pronation/supination 4# 3x10    Trampoline ball throw 3x10                                     He will cont to make appointments.

## 2023-09-11 ENCOUNTER — ATHLETIC TRAINING (OUTPATIENT)
Dept: SPORTS MEDICINE | Facility: OTHER | Age: 23
End: 2023-09-11

## 2023-09-11 DIAGNOSIS — M25.531 RIGHT WRIST PAIN: Primary | ICD-10-CM

## 2023-09-11 NOTE — PROGRESS NOTES
Athletic Training Progress Note    Name: Jaquelyn Severance  Age: 23 y.o. Assessment/Plan:     Visit Diagnosis: Right wrist pain [M25.531]    Treatment Plan: See below. []  Follow-up PRN. []  Follow-up prior to next practice/game for re-evaluation. [x]  Daily treatment/rehab. Progress note expected weekly. Subjective: Ath reports to the clinic today to work on his wrist. He states that the IASTM helped with the pain after the last appointment. He states that it started to bother him over the weekend. Objective:   n/a    Treatment Log:     Date:  9/8/23 9/4/23   Playing Status:  As tolerated As tolerated         Exercise/Treatment      CUS, 1.5W/cm2, 3MHz, 7min  completed completed   IASTM completed completed completed   4-way wrist 4# 3x10 5# 3x10    Pronation/supination 4# 3x10 5# 3x10    Trampoline ball throw  3x10                                           He will cont to make appointments.

## 2024-01-18 ENCOUNTER — ATHLETIC TRAINING (OUTPATIENT)
Dept: SPORTS MEDICINE | Facility: OTHER | Age: 24
End: 2024-01-18

## 2024-01-18 DIAGNOSIS — M54.50 CHRONIC RIGHT-SIDED LOW BACK PAIN WITHOUT SCIATICA: Primary | ICD-10-CM

## 2024-01-18 DIAGNOSIS — G89.29 CHRONIC RIGHT-SIDED LOW BACK PAIN WITHOUT SCIATICA: Primary | ICD-10-CM

## 2024-01-18 NOTE — PROGRESS NOTES
Athletic Training Back Evaluation    Name: Fer Gonzalez  Age: 23 y.o.   School District: [employer]  Sport: Lacrosse  Date of Assessment: 1/18/2024    Assessment/Plan:     Visit Diagnosis: Chronic right-sided low back pain without sciatica [M54.50, G89.29]    Treatment Plan: See below    [x]  Follow-up PRN.   []  Follow-up prior to next practice/game for re-evaluation.  []  Daily treatment/rehab. Progress note expected weekly.     Referral:     [x]  Not needed at this time  []  Referred to:     []  Coaching staff notified  []  Parent/Guardian Notified    Subjective:    Date of Injury: Over christmas break (12/21/23)    Injury occurred during:     []  Practice  []  Competition  [x]  Other: no specific event    Mechanism: unknown    Previous History: Has had LBP in the past    Reported Symptoms:     [] Pain with rest [] Numbness or tingling   [x] Pain with activity [] Radiating pain   [] Pain with sleeping [] Weakness   [x] Sharp pain [] Loss of motion   [] Dull pain [] Twisted   [] Pressure [] Felt/heard a pop   [] Burning [] Galliano/heard a crack     Objective:    Observation:     [x]  No observable findings compared bilaterally    [] Swelling [] Pelvic tilt   [] Deformity [] Spine curvature   [] Ecchymosis [] Winged scapula   [] Muscle spasm [] Abnormal posture   [] Abnormal gait [] Atrophy     Palpation: TTP over the erector spinae muscle groups     Active Range of Motion:      Full  ROM Limited  ROM Pain  with  ROM No  Motion   Trunk Flexion  [x] [] [x] []   Trunk Extension [x] [] [] []   Lateral Flexion (Left) [x] [] [] []   Lateral Flexion (Right) [x] [] [] []   Trunk Rotation (Left) [x] [] [] []   Trunk Rotation (Right) [x] [] [] []   Hip Flexion [x] [] [] []   Hip Extension [] [] [] []     Manual Muscle Tests:     Not performed [x]             5 4+ 4 4- 3 or  Under   Trunk Flexion  [] [] [] [] []   Trunk Extension [] [] [] [] []   Lateral Flexion (Left) [] [] [] [] []   Lateral Flexion (Right) [] []  [] [] []   Trunk Rotation (Left) [] [] [] [] []   Trunk Rotation (Right) [] [] [] [] []   Hip Flexion [] [] [] [] []   Hip Extension [] [] [] [] []     Special Tests:      (+)  POS (-)  NEG Not  Tested   Spring Test [] [x] []   Straight Leg Raise [] [x] []   Valsalva [] [x] []   Milgram's [] [] []   Kernig's/Anaiki's []  [] []   Slump [] [] []   Quadrant [] [] []   Bowstring [] [] []   SI Compression/Distraction [] [] []   JULIANE [] [] []   Long Sit Test [] [] []   Trendelenberg's  [] [] []   Bryant [] [] []     Lower Quarter Screen:  [x]  WNL  []  Abnormal    Treatment Log:     Date: 1/18/24   Playing Status: Full go       Exercise/Treatment    MHP 10min   Cupping Completed   Lumbar mobilization Completed                                       He was educated that LBP is very common and can go away on its own without treatment. He will cont to make appointments prn.

## 2024-02-04 ENCOUNTER — ATHLETIC TRAINING (OUTPATIENT)
Dept: SPORTS MEDICINE | Facility: OTHER | Age: 24
End: 2024-02-04

## 2024-02-04 DIAGNOSIS — M62.89 MUSCLE TIGHTNESS: Primary | ICD-10-CM

## 2024-02-05 NOTE — PROGRESS NOTES
2/4/24  Ath comes in requesting cupping for his upper back that has been tight. Cupping was completed for 5min. Ath felt relief after completed.   BD ATC

## 2024-02-07 ENCOUNTER — ATHLETIC TRAINING (OUTPATIENT)
Dept: SPORTS MEDICINE | Facility: OTHER | Age: 24
End: 2024-02-07

## 2024-02-07 DIAGNOSIS — S29.011A STRAIN OF LEFT PECTORALIS MUSCLE, INITIAL ENCOUNTER: ICD-10-CM

## 2024-02-07 DIAGNOSIS — G54.0 TOS (THORACIC OUTLET SYNDROME): Primary | ICD-10-CM

## 2024-02-07 NOTE — PROGRESS NOTES
Athletic Training Shoulder Evaluation    Name: Fer Gonzalez  Age: 23 y.o.   School District: [employer] [facility]  Sport: Lacrosse  Date of Assessment: 2/7/2024    Assessment/Plan:     Visit Diagnosis: TOS (thoracic outlet syndrome) [G54.0]    Treatment Plan: See below    []  Follow-up PRN.   []  Follow-up prior to next practice/game for re-evaluation.  [x]  Daily treatment/rehab. Progress note expected weekly.     Referral:     [x]  Not needed at this time  []  Referred to:     [x]  Coaching staff notified  []  Parent/Guardian Notified    Subjective:    Date of Injury: 1/31/24    Injury occurred during:     [x]  Practice  []  Competition  []  Other:     Mechanism: No known mechanism    Previous History: Has had previous history of pec tightness    Reported Symptoms:     [] Felt/heard a pop [] Pressure   [] Pain with rest [] Locking   [x] Pain with activity [] Burning   [x] Pain with overhead activity [x] Weakness   [x] Paresthesia [x] Loss of motion   [] Sharp pain [] Crepitus   [x] Dull pain [] Clicking   [x] Radiating pain [] Popping sensation   [] Felt give way [] Snapping sensation   [] FOOSH [] Cervical pain     Objective:    Observation:     []  No observable findings compared bilaterally    [] Swelling [] Asymmetry (in motion)   [] Ecchymosis [] Winged scapula   [] Deformity [] Scapular dyskinesis   [] Atrophy [] Uneven shoulders   [x] Muscle spasm [] Spine curvature     Uneven shoulders with the left being higher.    Palpation: TTP over the coracoid process of right scapula. TTP over the upper traps along with muscle spasm noticed.    Active Range of Motion:      Full  ROM Limited  ROM Pain  with  ROM No  Motion   Shoulder Flexion [] [x] [] []   Shoulder Extension [x] [] [] []   Shoulder Abduction [] [x] [] []   Shoulder Adduction [x] [] [] []   Horizontal Abduction [x] [] [] []   Horizontal Adduction [x] [] [] []   Internal Rotation  [x] [] [] []   Internal Rotation  [x] [] [] []   Scapular Retraction  []  [] [] []   Scapular Protraction [] [] [] []     Manual Muscle Tests:     Not performed []             5 4+ 4 4- 3 or  Under   Shoulder Flexion [x] [] [] [] []   Shoulder Extension [x] [] [] [] []   Shoulder Abduction [x] [] [] [] []   Shoulder Adduction [x] [] [] [] []   Horizontal Abduction [x] [] [] [] []   Horizontal Adduction [] [x] [] [] []   Internal Rotation  [x] [] [] [] []   Internal Rotation  [x] [] [] [] []     Special Tests:      (+)  POS (-)  NEG Not  Tested   Anterior Apprehension [] [x] []   Relocation [] [] []   Posterior Apprehension [] [x] []   Anterior Load & Shift [] [x] []   AC Compression [] [x] []   Sulcus Sign [] [x] []   Clunk [] [] []   Crank [] [] []   Drop Arm [] [x] []   Empty Can [] [x] []   Pato's [] [] []   Speed's [] [] []   Ovidio's [] [] []   Edna's [] [] []   Aguadilla's [] [] []   Ghassan's [x] [] []   Jw's [] [] []     Treatment Log:     Date: 2/7/24   Playing Status: Full go       Exercise/Treatment    Cupping completed   Dumbell flys 6# 2x10 5s holds   Punch ups 6# 2x10   Body blade in 90 degree ABD 3x30s                                  He will cont to make appointments.

## 2024-02-25 ENCOUNTER — ATHLETIC TRAINING (OUTPATIENT)
Dept: SPORTS MEDICINE | Facility: OTHER | Age: 24
End: 2024-02-25

## 2024-02-25 DIAGNOSIS — G54.0 TOS (THORACIC OUTLET SYNDROME): Primary | ICD-10-CM

## 2024-02-26 NOTE — PROGRESS NOTES
Athletic Training Shoulder Evaluation    Name: Fer Gonzalez  Age: 23 y.o.   School District: [employer] [facility]  Sport: Lacrosse  Date of Assessment: 2/25/2024    Assessment/Plan:     Visit Diagnosis: No primary diagnosis found.    Treatment Plan: See below    []  Follow-up PRN.   []  Follow-up prior to next practice/game for re-evaluation.  [x]  Daily treatment/rehab. Progress note expected weekly.     Referral:     [x]  Not needed at this time  []  Referred to:     [x]  Coaching staff notified  []  Parent/Guardian Notified    Subjective:    Date of Injury: 1/31/24    Injury occurred during:     [x]  Practice  []  Competition  []  Other:     Mechanism: No known mechanism    Previous History: Has had previous history of pec tightness    Reported Symptoms:     [] Felt/heard a pop [] Pressure   [] Pain with rest [] Locking   [x] Pain with activity [] Burning   [x] Pain with overhead activity [x] Weakness   [x] Paresthesia [x] Loss of motion   [] Sharp pain [] Crepitus   [x] Dull pain [] Clicking   [x] Radiating pain [] Popping sensation   [] Felt give way [] Snapping sensation   [] FOOSH [] Cervical pain     Objective:    Observation:     []  No observable findings compared bilaterally    [] Swelling [] Asymmetry (in motion)   [] Ecchymosis [] Winged scapula   [] Deformity [] Scapular dyskinesis   [] Atrophy [] Uneven shoulders   [x] Muscle spasm [] Spine curvature     Uneven shoulders with the left being higher.    Palpation: TTP over the coracoid process of right scapula. TTP over the upper traps along with muscle spasm noticed.    Active Range of Motion:      Full  ROM Limited  ROM Pain  with  ROM No  Motion   Shoulder Flexion [] [x] [] []   Shoulder Extension [x] [] [] []   Shoulder Abduction [] [x] [] []   Shoulder Adduction [x] [] [] []   Horizontal Abduction [x] [] [] []   Horizontal Adduction [x] [] [] []   Internal Rotation  [x] [] [] []   Internal Rotation  [x] [] [] []   Scapular Retraction  [] [] [] []    Scapular Protraction [] [] [] []     Manual Muscle Tests:     Not performed []             5 4+ 4 4- 3 or  Under   Shoulder Flexion [x] [] [] [] []   Shoulder Extension [x] [] [] [] []   Shoulder Abduction [x] [] [] [] []   Shoulder Adduction [x] [] [] [] []   Horizontal Abduction [x] [] [] [] []   Horizontal Adduction [] [x] [] [] []   Internal Rotation  [x] [] [] [] []   Internal Rotation  [x] [] [] [] []     Special Tests:      (+)  POS (-)  NEG Not  Tested   Anterior Apprehension [] [x] []   Relocation [] [] []   Posterior Apprehension [] [x] []   Anterior Load & Shift [] [x] []   AC Compression [] [x] []   Sulcus Sign [] [x] []   Clunk [] [] []   Crank [] [] []   Drop Arm [] [x] []   Empty Can [] [x] []   Pato's [] [] []   Speed's [] [] []   Ovidio's [] [] []   Edna's [] [] []   [place]'s [] [] []   Ghassan's [x] [] []   Jw's [] [] []     Treatment Log:     Date: 2/7/24   Playing Status: Full go       Exercise/Treatment    Cupping completed   Dumbell flys 6# 2x10 5s holds   Punch ups 6# 2x10   Body blade in 90 degree ABD 3x30s                                  He will cont to make appointments.    2/25  Completed cupping on upper back.  LB ATC

## 2024-03-01 ENCOUNTER — ATHLETIC TRAINING (OUTPATIENT)
Dept: SPORTS MEDICINE | Facility: OTHER | Age: 24
End: 2024-03-01

## 2024-03-01 DIAGNOSIS — G54.0 TOS (THORACIC OUTLET SYNDROME): Primary | ICD-10-CM

## 2024-03-01 NOTE — PROGRESS NOTES
Fer reports for continued treatment of his TOS.  He has little to no discomfort and no issues while playing lax.  HP x 10 min, UE nerve glides, body blade stability.  Tolerated well.    MONICO

## 2024-03-06 ENCOUNTER — ATHLETIC TRAINING (OUTPATIENT)
Dept: SPORTS MEDICINE | Facility: OTHER | Age: 24
End: 2024-03-06

## 2024-03-06 DIAGNOSIS — S09.90XA INJURY OF HEAD, INITIAL ENCOUNTER: Primary | ICD-10-CM

## 2024-03-06 NOTE — PROGRESS NOTES
"3/6/24  During practice nina took a shot and fell on the left side of his body. He states he hit the left side of his head first, then his legs went in the air hyperextending his back. AT assessed nina on field. He was not tender upon palpations, and had full ROM no pain. He stated he felt slightly dizzy during ROM testing. About 10min later, a SCAT6 was completed. Symptoms 6/22; Symptom severity 11/132. Nina rated himself 85% \"landed on head/neck and feel a bit out of it and my back hurts from the fall\".     Nina is going home for spring break. He was advised to rest and fill out symptom checklists daily. He was advised of red flags to look out for and was told to notify AT if any occur. He will follow up in person when he returns from break.     BD ATC  "

## 2024-03-25 ENCOUNTER — ATHLETIC TRAINING (OUTPATIENT)
Dept: SPORTS MEDICINE | Facility: OTHER | Age: 24
End: 2024-03-25

## 2024-03-25 DIAGNOSIS — M76.892 HAMSTRING TENDINITIS OF LEFT THIGH: Primary | ICD-10-CM

## 2024-03-26 ENCOUNTER — ATHLETIC TRAINING (OUTPATIENT)
Dept: SPORTS MEDICINE | Facility: OTHER | Age: 24
End: 2024-03-26

## 2024-03-26 DIAGNOSIS — M76.892 HAMSTRING TENDINITIS OF LEFT THIGH: Primary | ICD-10-CM

## 2024-03-26 NOTE — PROGRESS NOTES
Athletic Training Knee Evaluation    Name: Fer Gonzalez  Age: 23 y.o.   School District: [employer]   Sport: Men's Lacrosse  Date of Assessment: 3/25/2024    Assessment/Plan:     Visit Diagnosis: Hamstring tendinitis of left thigh [M76.892]    Treatment Plan:     [x]  Follow-up PRN.   []  Follow-up prior to next practice/game for re-evaluation.  [x]  Daily treatment/rehab. Progress note expected weekly.     Referral:     []  Not needed at this time  []  Referred to:     [x]  Coaching staff notified  []  Parent/Guardian Notified    Subjective:    Date of Injury: 3/25    Injury occurred during:     [x]  Practice  []  Competition  []  Other:     Mechanism: Landing on knee, but also has been dealing with lateral knee pain for this season    Previous History: None    Reported Symptoms:     [] Felt pop [] Grinding   [] [place]Minn a pop [] Pressure   [] Pain with rest [] Burning   [x] Pain with activity [x] Weakness   [x] Pain with stairs [] Loss of motion   [x] Sharp pain [] Clicking   [] Dull pain [] Snapping sensation   [] Radiating pain [] Locking   [] Felt give way       Objective:    Observation:     [x]  No observable findings compared bilaterally    [] Swelling [] Genu recurvatum   [] Effusion [] Genu valgum   [] Deformity [] Genu varus   [] Ecchymosis [] Patella reece   [] Abnormal gait [] Patella baja   [] Atrophy [] Squinting patellae   [] Muscle spasm [] “Frog eye” patellae     Palpation: TTP directly on lateral hamstring insertion and tendon    Active Range of Motion:      Full  ROM Limited  ROM Pain  with  ROM No  Motion   Knee Flexion [x] [] [x] []   Knee Extension [x] [] [] []   Hip Flexion [x] [] [] []   Hip Extension [x] [] [] []   Hip Abduction [x] [] [] []   Hip Adduction [x] [] [] []   Dorsiflexion [x] [] [] []   Plantarflexion [x] [] [] []     Manual Muscle Tests:     Not performed [x]             5 4+ 4 4- 3 or  Under   Knee Flexion [] [] [] [] []   Knee Extension [] [] [] [] []   Hip Flexion []  [] [] [] []   Hip Extension [] [] [] [] []   Hip Abduction [] [] [] [] []   Hip Adduction [] [] [] [] []   Dorsiflexion [] [] [] [] []   Plantarflexion [] [] [] [] []     Special Tests:      (+)  Laxity (+)  Pain (-)  WNL Not  Tested   Lachman [] [] [x] []   Anterior Drawer [] [] [x] []   Pivot Shift [] [] [] []   Posterior Drawer [] [] [x] []   Sag [] [] [x] []   Valgus (0 Degrees) [] [] [x] []   Valgus (30 Degrees) [] [] [x] []   Varus (0 Degrees) [] [] [x] []   Varus (30 Degrees) [] [] [x] []   Bianka [] [] [x] []   Thessally's [] [] [x] []   Apley's [] [] [] []   Jeferson's [] [] [] []   Patellar Apprehension [] [] [] []   Patellar Glide [] [] [] []   Ballotable Patella  [] [] [] []     Treatment Log:  Pt was starting to worry that his knee pain was something serious and was relieved with this just being his hamstring. He will make an appointment tomorrow to begin therapeutic exercises.  Date:    Playing Status:        Exercise/Treatment

## 2024-03-28 NOTE — PROGRESS NOTES
Athletic Training Progress Note    Name: Fer Gonzalez  Age: 23 y.o.     Assessment/Plan:     Visit Diagnosis: Hamstring tendinitis of left thigh [M76.562]    Treatment Plan: See below. Treat for hamstring and joint capsule pain.    []  Follow-up PRN.   []  Follow-up prior to next practice/game for re-evaluation.  [x]  Daily treatment/rehab. Progress note expected weekly.     Subjective: Fer comes in today to work on his knee. He states that it is not just 'age' or the sport that is causing the pain. He states that he saw another AT who said it is most likely hamstring pain. He states that he is able to play and everything it is just annoying. He also states that there is pain on the inside and the outside of his knee.    Objective:   TTP over distal bicep femoris tendon. Slight pain with varus with very slight laxity.    Treatment Log:     Date: 3/26/24   Playing Status: Full go       Exercise/Treatment    Bike  10min   Clam shells blue band 3x10   Stool slides 5x   Ball squeeze 3x10 5s holds   Glute Bridge blue band 3x10                              He was told that he when he came the first time, there was no mechanism to tie back to an injury. Now he had a mechanism. He will cont to make appointments.

## 2024-04-01 ENCOUNTER — ATHLETIC TRAINING (OUTPATIENT)
Dept: SPORTS MEDICINE | Facility: OTHER | Age: 24
End: 2024-04-01

## 2024-04-01 DIAGNOSIS — M76.892 HAMSTRING TENDINITIS OF LEFT THIGH: Primary | ICD-10-CM

## 2024-04-02 NOTE — PROGRESS NOTES
Athletic Training Progress Note    Name: Fer Gonzalez  Age: 23 y.o.     Assessment/Plan:     Visit Diagnosis: Hamstring tendinitis of left thigh [M76.892]    Treatment Plan: See below. Treat for hamstring and joint capsule pain.    []  Follow-up PRN.   []  Follow-up prior to next practice/game for re-evaluation.  [x]  Daily treatment/rehab. Progress note expected weekly.     Subjective: Fer comes in today to work on his knee. He states that the time they had off for [employer] really helped.  He states he can still feel the pain but he says it is not as much as what it was prior to leaving for break.    Objective:   N/a    Treatment Log:     Date: 4/1/24 3/26/24   Playing Status: Full go Full go        Exercise/Treatment     Bike   10min   Clam shells blue band 3x10 3x10   Stool slides 10x 5x   Ball squeeze  3x10 5s holds   Glute Bridge kick out blue band 3x10  3x10   10 laps in gym completed                                He will cont to make appointments.

## 2024-04-08 ENCOUNTER — ATHLETIC TRAINING (OUTPATIENT)
Dept: SPORTS MEDICINE | Facility: OTHER | Age: 24
End: 2024-04-08

## 2024-04-08 DIAGNOSIS — M62.89 TIGHT FASCIA: Primary | ICD-10-CM

## 2024-04-08 NOTE — PROGRESS NOTES
Athletic Training Progress Note    Name: Fer Gonzalez  Age: 23 y.o.     Assessment/Plan:     Visit Diagnosis: No primary diagnosis found.    Treatment Plan: See below. Treat for hamstring and joint capsule pain.    []  Follow-up PRN.   []  Follow-up prior to next practice/game for re-evaluation.  [x]  Daily treatment/rehab. Progress note expected weekly.     Subjective: Fer comes in today to work on his knee. He states that the time they had off for [employer] break really helped.  He states he can still feel the pain but he says it is not as much as what it was prior to leaving for break.    Objective:   N/a    Treatment Log:     Date: 4/1/24 3/26/24   Playing Status: Full go Full go        Exercise/Treatment     Bike   10min   Clam shells blue band 3x10 3x10   Stool slides 10x 5x   Ball squeeze  3x10 5s holds   Glute Bridge kick out blue band 3x10  3x10   10 laps in gym completed                                He will cont to make appointments.    4/7  Pt completed cupping for back.  LB ATC

## 2025-01-27 ENCOUNTER — ATHLETIC TRAINING (OUTPATIENT)
Dept: SPORTS MEDICINE | Facility: OTHER | Age: 25
End: 2025-01-27

## 2025-01-27 DIAGNOSIS — M62.89 TIGHTNESS OF FASCIA: Primary | ICD-10-CM

## 2025-01-31 ENCOUNTER — APPOINTMENT (OUTPATIENT)
Age: 25
End: 2025-01-31
Payer: COMMERCIAL

## 2025-01-31 ENCOUNTER — OFFICE VISIT (OUTPATIENT)
Age: 25
End: 2025-01-31
Payer: COMMERCIAL

## 2025-01-31 VITALS
TEMPERATURE: 97.8 F | RESPIRATION RATE: 18 BRPM | SYSTOLIC BLOOD PRESSURE: 100 MMHG | HEART RATE: 75 BPM | HEIGHT: 67 IN | BODY MASS INDEX: 25.11 KG/M2 | WEIGHT: 160 LBS | DIASTOLIC BLOOD PRESSURE: 84 MMHG | OXYGEN SATURATION: 98 %
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
## 2025-01-31 DIAGNOSIS — S29.9XXA RIB INJURY: ICD-10-CM

## 2025-01-31 DIAGNOSIS — S22.31XA FRACTURE OF ONE RIB, RIGHT SIDE, INITIAL ENCOUNTER FOR CLOSED FRACTURE: Primary | ICD-10-CM

## 2025-01-31 PROCEDURE — 71101 X-RAY EXAM UNILAT RIBS/CHEST: CPT

## 2025-01-31 PROCEDURE — 99204 OFFICE O/P NEW MOD 45 MIN: CPT | Performed by: PHYSICIAN ASSISTANT

## 2025-01-31 RX ORDER — IBUPROFEN 600 MG/1
600 TABLET, FILM COATED ORAL EVERY 6 HOURS PRN
Qty: 30 TABLET | Refills: 0 | Status: SHIPPED | OUTPATIENT
Start: 2025-01-31

## 2025-01-31 RX ORDER — METOCLOPRAMIDE 10 MG/1
10 TABLET ORAL AS NEEDED
COMMUNITY

## 2025-01-31 NOTE — LETTER
January 31, 2025     Patient: Fer Gonzalez   YOB: 2000   Date of Visit: 1/31/2025       To Whom it May Concern:    Fer Gonzalez was seen in my clinic on 1/31/2025. He has been diagnosed with a fracture of the right 10th rib. Overall healing time is expected to be 6-8 weeks.     If you have any questions or concerns, please don't hesitate to call.         Sincerely,          Wale Johnson PA-C        CC: No Recipients

## 2025-02-01 ENCOUNTER — ATHLETIC TRAINING (OUTPATIENT)
Dept: SPORTS MEDICINE | Facility: OTHER | Age: 25
End: 2025-02-01

## 2025-02-01 DIAGNOSIS — S22.32XA CLOSED FRACTURE OF ONE RIB OF LEFT SIDE, INITIAL ENCOUNTER: Primary | ICD-10-CM

## 2025-02-01 NOTE — PROGRESS NOTES
[facility]        NAME: Fer Gonzalez is a 24 y.o. male  : 2000    MRN: [id number]  DATE: 2025  TIME: 7:22 PM    Assessment and Plan   Fracture of one rib, right side, initial encounter for closed fracture [S22.31XA]  1. Fracture of one rib, right side, initial encounter for closed fracture  XR ribs right w pa chest min 3 views    ibuprofen (MOTRIN) 600 mg tablet    CANCELED: XR ribs 2 vw right      2. Injury due to being struck during sports event  XR ribs right w pa chest min 3 views    ibuprofen (MOTRIN) 600 mg tablet    CANCELED: XR ribs 2 vw right            Patient Instructions     Pt has suffered what I suspect is a right 10th rib fracture seen on X-ray. I prescribed him IBU and rec ice, rest, deep breathing exercises intermittently. Overall healing time expected to be 6-8 weeks. Gave him letter for sports.   Follow up with PCP in 3-5 days.  Proceed to  ER if symptoms worsen.    If tests have been performed at [facility], our office will contact you with results if changes need to be made to the care plan discussed with you at the visit.  [facility]    Chief Complaint     Chief Complaint   Patient presents with    Sports injury     At practice today got hit with lacrosse stick across his left side of ribs, Trainer check him and continues to horace, later on when about to swing forward he felt a pop on right side         History of Present Illness       Pt presents after suffering injury to his right ribcage while participating in lacrosse. During practice today, he was hit on the posterior right ribs by a lacrosse stick. He reports pain worse with certain positions, deep breathing, cough, sneeze. Denies SOB.         Review of Systems   Review of Systems   Constitutional: Negative.    Respiratory: Negative.     Cardiovascular: Negative.    Gastrointestinal: Negative.    Genitourinary: Negative.    Musculoskeletal:         Right posterior  "rib pain S/P injury         Current Medications       Current Outpatient Medications:     ibuprofen (MOTRIN) 600 mg tablet, Take 1 tablet (600 mg total) by mouth every 6 (six) hours as needed for mild pain or moderate pain, Disp: 30 tablet, Rfl: 0    metoclopramide (REGLAN) 10 mg tablet, Take 10 mg by mouth as needed, Disp: , Rfl:     Current Allergies     Allergies as of 01/31/2025    (No Known Allergies)            The following portions of the patient's history were reviewed and updated as appropriate: allergies, current medications, past family history, past medical history, past social history, past surgical history and problem list.     History reviewed. No pertinent past medical history.    History reviewed. No pertinent surgical history.    History reviewed. No pertinent family history.      Medications have been verified.        Objective   /84 (BP Location: Right arm, Patient Position: Sitting, Cuff Size: Large)   Pulse 75   Temp 97.8 °F (36.6 °C)   Resp 18   Ht 5' 7\" (1.702 m)   Wt 72.6 kg (160 lb)   SpO2 98%   BMI 25.06 kg/m²   No LMP for male patient.       Physical Exam     Physical Exam  Vitals reviewed.   Constitutional:       General: He is not in acute distress.     Appearance: He is well-developed.   Cardiovascular:      Rate and Rhythm: Normal rate and regular rhythm.      Heart sounds: Normal heart sounds. No murmur heard.  Pulmonary:      Effort: Pulmonary effort is normal. No respiratory distress.      Breath sounds: Normal breath sounds.   Musculoskeletal:      Comments: TTP over the posterior right lower ribcage. No visible/palpable swelling, ecchymosis, or deformity noted.    Neurological:      Mental Status: He is alert and oriented to person, place, and time.                   "

## 2025-02-03 NOTE — PROGRESS NOTES
[facility]           NAME: Fer Gonzalez is a 24 y.o. male  : 2000            MRN: [id number]  DATE: 2025  TIME: 7:22 PM     Assessment and Plan   Fracture of one rib, right side, initial encounter for closed fracture [S22.31XA]  1. Fracture of one rib, right side, initial encounter for closed fracture  XR ribs right w pa chest min 3 views     ibuprofen (MOTRIN) 600 mg tablet     CANCELED: XR ribs 2 vw right       2. Injury due to being struck during sports event  XR ribs right w pa chest min 3 views     ibuprofen (MOTRIN) 600 mg tablet     CANCELED: XR ribs 2 vw right                Patient Instructions      Pt has suffered what I suspect is a right 10th rib fracture seen on X-ray. I prescribed him IBU and rec ice, rest, deep breathing exercises intermittently. Overall healing time expected to be 6-8 weeks. Gave him letter for sports.   Follow up with PCP in 3-5 days.  Proceed to  ER if symptoms worsen.     If tests have been performed at [facility], our office will contact you with results if changes need to be made to the care plan discussed with you at the visit.  [facility]     Chief Complaint           Chief Complaint   Patient presents with    Sports injury       At practice today got hit with lacrosse stick across his left side of ribs, Trainer check him and continues to horace, later on when about to swing forward he felt a pop on right side            History of Present Illness         Pt presents after suffering injury to his right ribcage while participating in lacrosse. During practice today, he was hit on the posterior right ribs by a lacrosse stick. He reports pain worse with certain positions, deep breathing, cough, sneeze. Denies SOB.            Review of Systems   Review of Systems   Constitutional: Negative.    Respiratory: Negative.     Cardiovascular: Negative.    Gastrointestinal: Negative.    Genitourinary: Negative.   "  Musculoskeletal:         Right posterior rib pain S/P injury            Current Medications        Current Medications      Current Outpatient Medications:     ibuprofen (MOTRIN) 600 mg tablet, Take 1 tablet (600 mg total) by mouth every 6 (six) hours as needed for mild pain or moderate pain, Disp: 30 tablet, Rfl: 0    metoclopramide (REGLAN) 10 mg tablet, Take 10 mg by mouth as needed, Disp: , Rfl:         Current Allergies          Allergies as of 01/31/2025    (No Known Allergies)                The following portions of the patient's history were reviewed and updated as appropriate: allergies, current medications, past family history, past medical history, past social history, past surgical history and problem list.      Medical History   History reviewed. No pertinent past medical history.        Surgical History   History reviewed. No pertinent surgical history.        Family History   History reviewed. No pertinent family history.           Medications have been verified.           Objective   /84 (BP Location: Right arm, Patient Position: Sitting, Cuff Size: Large)   Pulse 75   Temp 97.8 °F (36.6 °C)   Resp 18   Ht 5' 7\" (1.702 m)   Wt 72.6 kg (160 lb)   SpO2 98%   BMI 25.06 kg/m²   No LMP for male patient.        Physical Exam      Physical Exam  Vitals reviewed.   Constitutional:       General: He is not in acute distress.     Appearance: He is well-developed.   Cardiovascular:      Rate and Rhythm: Normal rate and regular rhythm.      Heart sounds: Normal heart sounds. No murmur heard.  Pulmonary:      Effort: Pulmonary effort is normal. No respiratory distress.      Breath sounds: Normal breath sounds.   Musculoskeletal:      Comments: TTP over the posterior right lower ribcage. No visible/palpable swelling, ecchymosis, or deformity noted.    Neurological:      Mental Status: He is alert and oriented to person, place, and time.                                 Instructions    After Visit " "Summary (Automatic SnapShot taken 2/2/2025)  Additional Documentation    Vitals: /84 (BP Location: Right arm, Patient Position: Sitting, Cuff Size: Large)     Pulse 75     Temp 97.8 °F (36.6 °C)     Resp 18     Ht 5' 7\" (1.702 m)     Wt 72.6 kg (160 lb)     SpO2 98%     BMI 25.06 kg/m²     BSA 1.84 m²     Pain Sc   6 (Loc: Rib Cage)          More Vitals   Flowsheets: Vitals Reassessment   SmartForms:  [facility]/PCSP WRAP UP REQUIREMENTS ADVANCED      SL AMB SF PHQ-9   Encounter Info: Billing Info,     History,     Allergies,     Detailed Report     2/1:  Pt tells me about his rib diagnosis. He will be held out of practice until further notice. He was told we could assist him with his recovery.  CM  "

## 2025-03-04 ENCOUNTER — ATHLETIC TRAINING (OUTPATIENT)
Dept: SPORTS MEDICINE | Facility: OTHER | Age: 25
End: 2025-03-04

## 2025-03-04 DIAGNOSIS — M62.89 TIGHTNESS OF FASCIA: Primary | ICD-10-CM

## 2025-03-05 ENCOUNTER — ATHLETIC TRAINING (OUTPATIENT)
Dept: SPORTS MEDICINE | Facility: OTHER | Age: 25
End: 2025-03-05

## 2025-03-05 DIAGNOSIS — S06.0X0A CONCUSSION WITHOUT LOSS OF CONSCIOUSNESS, INITIAL ENCOUNTER: Primary | ICD-10-CM

## 2025-03-06 ENCOUNTER — ATHLETIC TRAINING (OUTPATIENT)
Dept: SPORTS MEDICINE | Facility: OTHER | Age: 25
End: 2025-03-06

## 2025-03-06 DIAGNOSIS — S06.0X0A CONCUSSION WITHOUT LOSS OF CONSCIOUSNESS, INITIAL ENCOUNTER: Primary | ICD-10-CM

## 2025-03-06 NOTE — PROGRESS NOTES
3/4:  Pt requested cupping on his back. Tolerated well.  CM    1/27:  Pt requested cupping on his back. Tolerated well.  CM

## 2025-03-07 ENCOUNTER — ATHLETIC TRAINING (OUTPATIENT)
Dept: SPORTS MEDICINE | Facility: OTHER | Age: 25
End: 2025-03-07

## 2025-03-07 DIAGNOSIS — S06.0X0A CONCUSSION WITHOUT LOSS OF CONSCIOUSNESS, INITIAL ENCOUNTER: Primary | ICD-10-CM

## 2025-03-08 ENCOUNTER — ATHLETIC TRAINING (OUTPATIENT)
Dept: SPORTS MEDICINE | Facility: OTHER | Age: 25
End: 2025-03-08

## 2025-03-08 DIAGNOSIS — S06.0X0A CONCUSSION WITHOUT LOSS OF CONSCIOUSNESS, INITIAL ENCOUNTER: Primary | ICD-10-CM

## 2025-03-09 ENCOUNTER — ATHLETIC TRAINING (OUTPATIENT)
Dept: SPORTS MEDICINE | Facility: OTHER | Age: 25
End: 2025-03-09

## 2025-03-09 DIAGNOSIS — S06.0X0A CONCUSSION WITHOUT LOSS OF CONSCIOUSNESS, INITIAL ENCOUNTER: Primary | ICD-10-CM

## 2025-03-10 ENCOUNTER — ATHLETIC TRAINING (OUTPATIENT)
Dept: SPORTS MEDICINE | Facility: OTHER | Age: 25
End: 2025-03-10

## 2025-03-10 DIAGNOSIS — S06.0X0A CONCUSSION WITHOUT LOSS OF CONSCIOUSNESS, INITIAL ENCOUNTER: Primary | ICD-10-CM

## 2025-03-11 ENCOUNTER — ATHLETIC TRAINING (OUTPATIENT)
Dept: SPORTS MEDICINE | Facility: OTHER | Age: 25
End: 2025-03-11

## 2025-03-11 DIAGNOSIS — S06.0X0A CONCUSSION WITHOUT LOSS OF CONSCIOUSNESS, INITIAL ENCOUNTER: Primary | ICD-10-CM

## 2025-03-12 NOTE — PROGRESS NOTES
"3/7  Symptom Evaluation     0 = No Symptoms; 1 or 2 = Mild Symptoms; 3 or 4 = Moderate Symptoms, 5 or 6 = Severe Symptoms       (Insert Columns as needed for subsequent dates)  Date: 3/7/2025   Symptom Symptom Score   Headache  2   Pressure in head  0   Neck Pain  0   Nausea or vomiting  0   Dizziness  2   Blurred Vision  0   Balance Problems  2   Sensitivity to light  0   Sensitivity to noise  0   Feeling slowed down  0   Feeling like \"in a fog\"  0   Don't feel right  1   Difficulty concentrating  0   Difficulty remembering  0   Fatigue or low energy  0   Confusion  0   Drowsiness  0   More emotional  0   Irritability  0   Sadness  0   Nervous or Anxious  0   Trouble falling asleep (if applicable)  0   Total number of Symptoms (of 22):  4   Symptom Severity Score (of 132):  7   Do your symptoms get worse with physical activity?  no   Do your symptoms get worse with mental activity?  no    85%  \"Still bit of s headache & dizzy\"  "

## 2025-03-12 NOTE — PROGRESS NOTES
"3/6:  Symptom Evaluation     0 = No Symptoms; 1 or 2 = Mild Symptoms; 3 or 4 = Moderate Symptoms, 5 or 6 = Severe Symptoms       (Insert Columns as needed for subsequent dates)  Date: 3/6/2025   Symptom Symptom Score   Headache 2    Pressure in head  1   Neck Pain  0   Nausea or vomiting  0   Dizziness  2   Blurred Vision  0   Balance Problems  1   Sensitivity to light  0   Sensitivity to noise  0   Feeling slowed down  1   Feeling like \"in a fog\"  1   Don't feel right  0   Difficulty concentrating  0   Difficulty remembering  0   Fatigue or low energy  0   Confusion  0   Drowsiness  0   More emotional  0   Irritability  0   Sadness  0   Nervous or Anxious  0   Trouble falling asleep (if applicable)  0   Total number of Symptoms (of 22):  6   Symptom Severity Score (of 132):  8   Do your symptoms get worse with physical activity? no   Do your symptoms get worse with mental activity? no          If 100% is feeling perfectly normal, what percent of normal do you feel? 80%     If not 100%, why? \"Headache & felt \"out of it \"\"     "

## 2025-03-12 NOTE — PROGRESS NOTES
"3/10: completed day 1 with light exercise, bike for 20. Reported no symptoms after   Symptom Evaluation     0 = No Symptoms; 1 or 2 = Mild Symptoms; 3 or 4 = Moderate Symptoms, 5 or 6 = Severe Symptoms       (Insert Columns as needed for subsequent dates)  Date: 3/10/2025   Symptom Symptom Score   Headache  0   Pressure in head  1   Neck Pain  0   Nausea or vomiting  0   Dizziness  0   Blurred Vision  0   Balance Problems  0   Sensitivity to light  0   Sensitivity to noise  0   Feeling slowed down  0   Feeling like \"in a fog\"  0   Don't feel right  0   Difficulty concentrating  0   Difficulty remembering  0   Fatigue or low energy  0   Confusion  0   Drowsiness  0   More emotional  0   Irritability  0   Sadness  0   Nervous or Anxious  0   Trouble falling asleep (if applicable)  0   Total number of Symptoms (of 22):  0   Symptom Severity Score (of 132):  0   Do your symptoms get worse with physical activity?  no   Do your symptoms get worse with mental activity?  no         If 100% is feeling perfectly normal, what percent of normal do you feel? 95%     If not 100%, why? \"Woke up w/ pressure in head but went away later in the day\"     "

## 2025-03-12 NOTE — PROGRESS NOTES
"3/8/25: symptom checklist and this day he also competed post injury 1 ImPACT  Symptom Evaluation     0 = No Symptoms; 1 or 2 = Mild Symptoms; 3 or 4 = Moderate Symptoms, 5 or 6 = Severe Symptoms       (Insert Columns as needed for subsequent dates)  Date: 3/8/2025   Symptom Symptom Score   Headache 1    Pressure in head  0   Neck Pain  0   Nausea or vomiting  0   Dizziness  2   Blurred Vision  0   Balance Problems  0   Sensitivity to light  0   Sensitivity to noise  0   Feeling slowed down  0   Feeling like \"in a fog\"  0   Don't feel right  1   Difficulty concentrating  0   Difficulty remembering  0   Fatigue or low energy  0   Confusion  0   Drowsiness  0   More emotional  0   Irritability  0   Sadness  0   Nervous or Anxious  0   Trouble falling asleep (if applicable)  0   Total number of Symptoms (of 22):  3   Symptom Severity Score (of 132):  4   Do your symptoms get worse with physical activity? no    Do your symptoms get worse with mental activity?  no         If 100% is feeling perfectly normal, what percent of normal do you feel? 85%     If not 100%, why? \"Bit of a headache but not bad & dizziness\"      "

## 2025-03-12 NOTE — PROGRESS NOTES
"3/9  Symptom Evaluation     0 = No Symptoms; 1 or 2 = Mild Symptoms; 3 or 4 = Moderate Symptoms, 5 or 6 = Severe Symptoms       (Insert Columns as needed for subsequent dates)  Date: 3/9/2025   Symptom Symptom Score   Headache  0   Pressure in head  1   Neck Pain  0   Nausea or vomiting  0   Dizziness  0   Blurred Vision  0   Balance Problems  0   Sensitivity to light  0   Sensitivity to noise  0   Feeling slowed down  0   Feeling like \"in a fog\"  0   Don't feel right  0   Difficulty concentrating  0   Difficulty remembering  0   Fatigue or low energy  0   Confusion  0   Drowsiness  0   More emotional  0   Irritability  0   Sadness  0   Nervous or Anxious  0   Trouble falling asleep (if applicable)  0   Total number of Symptoms (of 22):  1   Symptom Severity Score (of 132):  1   Do your symptoms get worse with physical activity?  no   Do your symptoms get worse with mental activity?  no         If 100% is feeling perfectly normal, what percent of normal do you feel? 90%     If not 100%, why? \"No more headache just pressure in my head/sinus \"     "

## 2025-03-12 NOTE — PROGRESS NOTES
"Athletic Training Head Injury Evaluation     Name: Fer Gonzalez  Age: 24 y.o.   School District: [employer]  Sport: lacrosse      Assessment/Plan:     Visit Diagnosis: No primary diagnosis found.     Treatment Plan:     []  Follow-up PRN.   [x]  Follow-up prior to next practice/game for re-evaluation.  []  Daily treatment/rehab. Progress note expected weekly.      Referral:      [x]  Not needed at this time  []  Will re-evaluate tomorrow to determine if referral is needed  []  Referred to:      [x]  Coaching staff notified  []  Parent/Guardian Notified     Subjective:  Date of Assessment: 3/5/2025  Date of Injury: 3/5/25     History: during last 12 seconds of game, opponent cross checked on pt head         Has the athlete ever been: Yes No   Hospitalized for a head injury? [] [x]   Diagnosed/treated for headache disorder or migraines? [x] []   Diagnosed with a learning disability/dyslexia? [] [x]   Diagnosed with ADD/ADHD [] [x]   Diagnosed with depression, anxiety, or other psychiatric disorder? [] [x]      Current medications? If yes, please list:   [x]  None  []  Yes:       Symptom Evaluation     0 = No Symptoms; 1 or 2 = Mild Symptoms; 3 or 4 = Moderate Symptoms, 5 or 6 = Severe Symptoms       (Insert Columns as needed for subsequent dates)  Date: 3/5/2025   Symptom Symptom Score   Headache 3    Pressure in head  1   Neck Pain  0   Nausea or vomiting  0   Dizziness  1   Blurred Vision  0   Balance Problems  2   Sensitivity to light  0   Sensitivity to noise  0   Feeling slowed down  2   Feeling like \"in a fog\"  2   Don't feel right  1   Difficulty concentrating  0   Difficulty remembering  0   Fatigue or low energy  2   Confusion  0   Drowsiness  0   More emotional  0   Irritability  0   Sadness  0   Nervous or Anxious  0   Trouble falling asleep (if applicable)  /   Total number of Symptoms (of 22):  8   Symptom Severity Score (of 132):  13   Do your symptoms get worse with physical activity?  /   Do your " "symptoms get worse with mental activity?  /         If 100% is feeling perfectly normal, what percent of normal do you feel? 80%     If not 100%, why?   \"I have a headache & I feel out of it.\"     Cognitive Screening     Orientation 0 1   What month is it? [] [x]   What is the date today? [] [x]   What is the day of the week? [] [x]   What year is it? [] [x]   What time is it right now? (Within 1 hour) [] [x]   Orientation Score 5  of 5      Balance Examination  Modified Balance Error Scoring System (mBESS) testing     Which foot was tested (i.e. which is the non-dominant foot):  [x]  Left  []  Right     Testing surface (hard floor, field, etc.): floor     Footwear (shoes, barefoot, braces, tape, etc.): shoes     Condition Errors   Double leg stance 0  of 10   Single leg stance (non-dominant foot)  3 of 10   Tandem stance (non-dominant foot at back)  1 of 10   Total Errors  4 of 30       did not do voms or any other testing as he did already report symptoms, and the game was over and after the game they had spring break so no practices were happening till 3/10   "

## 2025-03-12 NOTE — PROGRESS NOTES
"3/11: completed day 2 moderate exercise, did some running at practice and did a wall ball drill, reports no symptoms after   Symptom Evaluation     0 = No Symptoms; 1 or 2 = Mild Symptoms; 3 or 4 = Moderate Symptoms, 5 or 6 = Severe Symptoms       (Insert Columns as needed for subsequent dates)  Date: 3/11/2025   Symptom Symptom Score   Headache  0   Pressure in head  0   Neck Pain  0   Nausea or vomiting  0   Dizziness  0   Blurred Vision  0   Balance Problems  0   Sensitivity to light  0   Sensitivity to noise  0   Feeling slowed down  0   Feeling like \"in a fog\"  0   Don't feel right  0   Difficulty concentrating  0   Difficulty remembering  0   Fatigue or low energy  0   Confusion  0   Drowsiness  0   More emotional  0   Irritability  0   Sadness  0   Nervous or Anxious  0   Trouble falling asleep (if applicable)  0   Total number of Symptoms (of 22):  0   Symptom Severity Score (of 132):  0   Do your symptoms get worse with physical activity?  no   Do your symptoms get worse with mental activity?  no    100%  "

## 2025-03-13 ENCOUNTER — OFFICE VISIT (OUTPATIENT)
Age: 25
End: 2025-03-13
Payer: COMMERCIAL

## 2025-03-13 VITALS — HEIGHT: 67 IN | WEIGHT: 160 LBS | BODY MASS INDEX: 25.11 KG/M2

## 2025-03-13 DIAGNOSIS — S06.0X0A CONCUSSION WITHOUT LOSS OF CONSCIOUSNESS, INITIAL ENCOUNTER: Primary | ICD-10-CM
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
## 2025-03-13 PROCEDURE — 99214 OFFICE O/P EST MOD 30 MIN: CPT | Performed by: STUDENT IN AN ORGANIZED HEALTH CARE EDUCATION/TRAINING PROGRAM

## 2025-03-13 PROCEDURE — 96132 NRPSYC TST EVAL PHYS/QHP 1ST: CPT | Performed by: STUDENT IN AN ORGANIZED HEALTH CARE EDUCATION/TRAINING PROGRAM

## 2025-03-13 NOTE — PROGRESS NOTES
Chief Complaint: head injury, concussion evaluation    HPI:       Patient ID:  Fer Gonzalez is a 24 y.o. male    Chief Complaint   Patient presents with    Head - Concussion     During Lacross It was a hit to the head. During a game an opponent ran into him. He was assessed by        School/Work: [employer]  Sport: Lacrosse  Date of Injury: 3/5/2025  Follow up interval: 1 week, 1 day    School Status: Back in school full-time    Injury Description:  Date / Time:  3/5/2025  :  Patient  Injury Description: During lacrosse match, an opponent cross check the front of his helmeted head with a stick  Evidence of forcible blow to the head:  no  Evidence of IC Injury / Fracture:  no  Location:  Frontal    Amnesia:   Retrograde:  no   Anterograde:  no   LOC:  no  Early Signs:  Dizziness and Headache  Seizures:  No  CT Scan:  No   History of Headaches at baseline: Yes. Pertinent baseline history of periorbital migraines that he states occurs every 2-3 times a week. He states that the headaches that he was experiencing after his head injury were different than his migraines however as it was central/frontal rather than periorbital  History of Concussion:  Yes.   How many?  2, How long to recover? About 1-2 weeks, and Last concussion?  2/15/2023     Headache History:  Reports being headache free for the past few days  Family History of Headache:  Yes.  If yes, who?  father  Developmental History:  Denies h/o ADHD/ADD  History of Sleep Disorder:  No  Psychiatric History:  + h/o anxiety  Do symptoms worsen with Physical Activity?  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  No  Overall Rating:  What percent is this person back to normal?  Patient 100 %      The following portions of the patient's history were reviewed and updated as appropriate: allergies, current medications, past family history, past  medical history, past social history, past surgical history, and problem list.             Symptoms Checklist      Flowsheet Row Most Recent Value   Physical    Headache 0   Nausea 0   Vomiting 0   Balance problems 0   Dizziness 0   Visual problems 0   Fatigue 0   Sensitivity to light 0   Sensitivity to noise 0   Numbness / tingling 0   TOTAL PHYSICAL SCORE 0   Cognitive    Foggy 0   Slowed down 0   Difficulty concentrating 0   Difficulty remembering 0   TOTAL COGNITIVE SCORE 0   Emotional    Irritability 0   Sadness 0   More emotional 0   Nervousness 0   TOTAL EMOTIONAL SCORE 0   Sleep    Drowsiness 0   Sleeping less 0   Sleeping more 0   Difficulty falling asleep 0   TOTAL SLEEP SCORE 0   TOTAL SYMPTOM SCORE 0              I have personally reviewed pertinent films in PACS.    No recent relevant imaging      There is no problem list on file for this patient.       Current Outpatient Medications on File Prior to Visit   Medication Sig Dispense Refill    ibuprofen (MOTRIN) 600 mg tablet Take 1 tablet (600 mg total) by mouth every 6 (six) hours as needed for mild pain or moderate pain 30 tablet 0    metoclopramide (REGLAN) 10 mg tablet Take 10 mg by mouth as needed       No current facility-administered medications on file prior to visit.        No Known Allergies     Tobacco Use: Low Risk  (3/13/2025)    Patient History     Smoking Tobacco Use: Never     Smokeless Tobacco Use: Never     Passive Exposure: Never   Recent Concern: Tobacco Use - Medium Risk (1/23/2025)    Received from [facility]    Patient History     Smoking Tobacco Use: Never     Smokeless Tobacco Use: Former     Passive Exposure: Not on file        Social Drivers of Health     Tobacco Use: Low Risk  (3/13/2025)    Patient History     Smoking Tobacco Use: Never     Smokeless Tobacco Use: Never     Passive Exposure: Never   Recent Concern: Tobacco Use - Medium Risk (1/23/2025)    Received from [facility]    Patient History  "    Smoking Tobacco Use: Never     Smokeless Tobacco Use: Former     Passive Exposure: Not on file   Alcohol Use: Alcohol Misuse (1/23/2025)    Received from [facility]    AUDIT-C     Frequency of Alcohol Consumption: 2-4 times a month     Average Number of Drinks: 5 or 6     Frequency of Binge Drinking: Monthly   Financial Resource Strain: Not on file   Food Insecurity: No Food Insecurity (1/23/2025)    Received from [facility]    Hunger Vital Sign     Worried About Running Out of Food in the Last Year: Never true     Ran Out of Food in the Last Year: Never true   Transportation Needs: Not on file   Physical Activity: Not on file   Stress: Not on file   Social Connections: Not on file   Intimate Partner Violence: Not At Risk (1/23/2025)    Received from [facility]    Intimate Partner Violence     Within the last year, have you been afraid of your partner, ex-partner or family member?: 2     Within the last year, have you been humiliated or emotionally abused in other ways by your partner, ex-partner or family member?: 2     Within the last year, have you been kicked, hit, slapped, or otherwise physically hurt by your partner, ex-partner or family member?: 2     Within the last year, have you been raped or forced to have any kind of sexual activity by your partner, ex-partner or family member?: 2   Depression: Not at risk (1/31/2025)    PHQ-2     PHQ-2 Score: 0   Housing Stability: Low Risk  (1/23/2025)    Received from [facility]    Housing Stability Vital Sign     Unable to Pay for Housing in the Last Year: No     Number of Times Moved in the Last Year: 1     Homeless in the Last Year: No   Utilities: Not on file   Health Literacy: Not on file        Review of Systems     Body mass index is 25.06 kg/m².     Physical Exam     Physical Exam       Ht 5' 7\" (1.702 m)   Wt 72.6 kg (160 lb)   BMI 25.06 kg/m²   General:   NAD:  Yes  Psych:   AAOX3:  Yes   Mood and " Affect:  Normal  HEENT:   Lacerations:  No   Bruising:  No   PEERLA:  Yes     Neuro:   Examination of Coordination:  Abnormal:   Limited Balance:   No, Past Pointing:   Normal, Single Leg Stance:   Abnormal.  Explain:  0 errors eyes open, 2 errors eyes closed, Forward Tandem Gait:   Normal, Backward Tandem Gait:   Normal, Eyes Close Tandem Gait:   Normal, Dysdiadochokinesia:   Bilateral:   No, and Finger - Nose Impaired:   Bilateral:   No   CNII - XII Intact:  Yes   FTN:  Normal   Accommodation:  5cm b/l (corrected w/ contacts)   Convergence:  5cm    Vestibular Ocular:  Gaze stability:  Normal       ImPACT Neurocognitive Test Interpretation:  Date of testing: 3/12/2025  Place of testing: Closed office at [facility]  Baseline test available and valid?  Yes    Composite Score Percentile Value Comparable to baseline   Memory Composite  (Verbal) 95 Yes   Memory Composite (visual) 85 Yes   Visual Motor Speed Composite: 64 Yes   Reaction Time Composite 85 Yes   Impulse control composite 10 Yes     Total Symptom Score: 5    Significant symptom worsening post-test ? No    Clinically correlated ImPACT neurocognitive scores appear comparable to baseline/ normative data? Yes    I spent a minimum of 31 minutes coordinating patient's  to conduct IMPACT test along with time analyzing, interpreting, and explaining the results of patient's IMPACT test on today's visit.     Assessment:     Diagnosis ICD-10-CM Associated Orders   1. Concussion without loss of consciousness, initial encounter  S06.0X0A       2. Injury while playing lacrosse  Y93.65           Plan:     I explained my current clinical findings to Fer Gonzalez . We had a detailed discussion with regards to pathophysiology of a concussion injury along with its immediate, short-term and long-term complications.      1. Physical activity -and continue/complete return to play protocol with his athletic training team in order to return back to  "sports/gym.     2. Cognitive / academic activity -no academic accommodations     3. Symptomatic treatment -none     4. Other management -none     5. Referrals made -none; I reached out to his athletic training team to discuss plan going forward      Follow-Up:    As needed        Portions of the record may have been created with voice recognition software. Occasional wrong word or \"sound alike\" substitutions may have occurred due to the inherent limitations of voice recognition software. Please review the chart carefully and recognize, using context, where substitutions/typographical errors may have occurred.          "

## 2025-03-20 ENCOUNTER — ATHLETIC TRAINING (OUTPATIENT)
Dept: SPORTS MEDICINE | Facility: OTHER | Age: 25
End: 2025-03-20

## 2025-03-20 DIAGNOSIS — M62.89 MUSCLE TIGHTNESS: Primary | ICD-10-CM

## 2025-03-20 NOTE — PROGRESS NOTES
3/20  Ath comes in today with c/o low back tightness. He states this started about 2 weeks ago and he has been in a few times for cupping which has temporarily alleviated his discomfort. He states when he wakes up in the morning it is super stiff and after activity it also becomes painful. He does not have any discomfort throughout the day prior to activity.     Full ROM with flexion and extension no pain  No TTP  No hip rotation present    Completed:  MHP 10min  Gliding cupping   Hamstring PNF technique   Glute bridge with march     Ath will continue coming in 2x/week.

## 2025-03-23 ENCOUNTER — ATHLETIC TRAINING (OUTPATIENT)
Dept: SPORTS MEDICINE | Facility: OTHER | Age: 25
End: 2025-03-23

## 2025-03-23 DIAGNOSIS — M25.512 ACUTE PAIN OF LEFT SHOULDER: Primary | ICD-10-CM

## 2025-03-24 ENCOUNTER — ATHLETIC TRAINING (OUTPATIENT)
Dept: SPORTS MEDICINE | Facility: OTHER | Age: 25
End: 2025-03-24

## 2025-03-24 DIAGNOSIS — M75.82 TENDINITIS OF LEFT PECTORALIS MAJOR: ICD-10-CM

## 2025-03-24 DIAGNOSIS — M25.512 ACUTE PAIN OF LEFT SHOULDER: Primary | ICD-10-CM

## 2025-03-24 NOTE — PROGRESS NOTES
"Athletic Training Shoulder Evaluation    Name: Fer Gonzalez  Age: 24 y.o.   School District: [employer]   Sport: Men's Lacrosse  Date of Assessment: 3/23/2025    Assessment/Plan:     Visit Diagnosis: Acute pain of left shoulder [M25.512]    Treatment Plan:     []  Follow-up PRN.   []  Follow-up prior to next practice/game for re-evaluation.  [x]  Daily treatment/rehab. Progress note expected weekly.     Referral:     []  Not needed at this time  []  Referred to:     [x]  Coaching staff notified  []  Parent/Guardian Notified    Subjective:    Date of Injury: 3/23/25    Injury occurred during:     []  Practice  [x]  Competition  []  Other:     Mechanism: Pt can not remember a specific mechanism, but states that he has had shoulder \"problems\" during previous lacrosse seasons    Previous History: Previous tendonitis of left shoulder, previous AC of left shoulder    Reported Symptoms:     [] Felt/heard a pop [] Pressure   [] Pain with rest [] Locking   [x] Pain with activity [] Burning   [x] Pain with overhead activity [x] Weakness   [] Paresthesia [] Loss of motion   [x] Sharp pain [] Crepitus   [] Dull pain [] Clicking   [] Radiating pain [] Popping sensation   [] Felt give way [] Snapping sensation   [] FOOSH [] Cervical pain     Objective:    Observation:     [x]  No observable findings compared bilaterally    [] Swelling [] Asymmetry (in motion)   [] Ecchymosis [] Winged scapula   [] Deformity [] Scapular dyskinesis   [] Atrophy [] Uneven shoulders   [] Muscle spasm [] Spine curvature     Palpation: Not TTP, but has pain with movement    Active Range of Motion:      Full  ROM Limited  ROM Pain  with  ROM No  Motion   Shoulder Flexion [x] [] [] []   Shoulder Extension [x] [] [] []   Shoulder Abduction [x] [] [x] []   Shoulder Adduction [x] [] [x] []   Horizontal Abduction [x] [] [x] []   Horizontal Adduction [x] [] [x] []   Internal Rotation  [x] [] [x] []   Internal Rotation  [x] [] [x] []   Scapular " Retraction  [] [] [] []   Scapular Protraction [] [] [] []     Manual Muscle Tests:     Not performed [x]             5 4+ 4 4- 3 or  Under   Shoulder Flexion [] [] [] [] []   Shoulder Extension [] [] [] [] []   Shoulder Abduction [] [] [] [] []   Shoulder Adduction [] [] [] [] []   Horizontal Abduction [] [] [] [] []   Horizontal Adduction [] [] [] [] []   Internal Rotation  [] [] [] [] []   Internal Rotation  [] [] [] [] []     Special Tests:      (+)  POS (-)  NEG Not  Tested   Anterior Apprehension [] [x] []   Relocation [] [x] []   Posterior Apprehension [] [x] []   Anterior Load & Shift [] [] []   AC Compression [x] [] []   Sulcus Sign [] [] []   Clunk [] [] []   Crank [] [] []   Drop Arm [] [] []   Empty Can [] [] []   Pato's [] [] []   Speed's [] [x] []   Ovidio's [] [x] []   Edna's [] [x] []   [place]im[place]'s [] [x] []   Ghassan's [] [] []   Jw's [] [] []     Treatment Log:    Date: 3/23/25   Playing Status: As tolerated       Exercise/Treatment    Electrical stimulation 20 mins

## 2025-03-27 ENCOUNTER — ATHLETIC TRAINING (OUTPATIENT)
Dept: SPORTS MEDICINE | Facility: OTHER | Age: 25
End: 2025-03-27

## 2025-03-27 DIAGNOSIS — M62.89 MUSCLE TIGHTNESS: Primary | ICD-10-CM

## 2025-03-27 NOTE — PROGRESS NOTES
3/27  Ath states his left low back has flared up again. He states it was doing better, but he had a tough game yesterday and thinks that aggravated him.     Completed:   - MHP with pre-mod B/L   - Gliding cupping low back  - Child pose stretches  - Hamstring stretches  - Nerve glides B/L      3/20  Ath comes in today with c/o low back tightness. He states this started about 2 weeks ago and he has been in a few times for cupping which has temporarily alleviated his discomfort. He states when he wakes up in the morning it is super stiff and after activity it also becomes painful. He does not have any discomfort throughout the day prior to activity.     Full ROM with flexion and extension no pain  No TTP  No hip rotation present    Completed:  MHP 10min  Gliding cupping   Hamstring PNF technique   Glute bridge with march     Ath will continue coming in 2x/week.

## 2025-03-28 ENCOUNTER — ATHLETIC TRAINING (OUTPATIENT)
Dept: SPORTS MEDICINE | Facility: OTHER | Age: 25
End: 2025-03-28

## 2025-03-28 DIAGNOSIS — S39.012D LOW BACK STRAIN, SUBSEQUENT ENCOUNTER: Primary | ICD-10-CM

## 2025-03-28 NOTE — PROGRESS NOTES
3/28  Ath states his low back feels worse today. He has been doing stretches on his own and feels that is not helping as much as it has in the past. His left side low back does not feel as tight as it did yesterday. He states stim and heat helped him yesterday.     Completed:   - MHP with pre-mod B/L   - Nerve glides  - Resisted side steps 15x black TB    3/27  Ath states his left low back has flared up again. He states it was doing better, but he had a tough game yesterday and thinks that aggravated him.     Completed:   - MHP with pre-mod B/L   - Gliding cupping low back  - Child pose stretches  - Hamstring stretches  - Nerve glides B/L      3/20  Ath comes in today with c/o low back tightness. He states this started about 2 weeks ago and he has been in a few times for cupping which has temporarily alleviated his discomfort. He states when he wakes up in the morning it is super stiff and after activity it also becomes painful. He does not have any discomfort throughout the day prior to activity.     Full ROM with flexion and extension no pain  No TTP  No hip rotation present    Completed:  MHP 10min  Gliding cupping   Hamstring PNF technique   Glute bridge with march     Ath will continue coming in 2x/week.

## 2025-03-31 ENCOUNTER — ATHLETIC TRAINING (OUTPATIENT)
Dept: SPORTS MEDICINE | Facility: OTHER | Age: 25
End: 2025-03-31

## 2025-03-31 DIAGNOSIS — M75.42 SHOULDER IMPINGEMENT SYNDROME, LEFT: Primary | ICD-10-CM

## 2025-03-31 NOTE — PROGRESS NOTES
3/31/25: pt came in for appointment and wants to focus on roger/ left shoulder, ultrasound was completed

## 2025-04-01 ENCOUNTER — ATHLETIC TRAINING (OUTPATIENT)
Dept: SPORTS MEDICINE | Facility: OTHER | Age: 25
End: 2025-04-01

## 2025-04-01 DIAGNOSIS — M62.89 TIGHTNESS OF FASCIA: Primary | ICD-10-CM

## 2025-04-01 NOTE — PROGRESS NOTES
4/1/25: pt came in for appointment to work on shoulder/roger, started with heat, then did some trigger release then ended with some cupping dragging

## 2025-04-02 ENCOUNTER — ATHLETIC TRAINING (OUTPATIENT)
Dept: SPORTS MEDICINE | Facility: OTHER | Age: 25
End: 2025-04-02

## 2025-04-02 DIAGNOSIS — M62.89 TIGHTNESS OF FASCIA: Primary | ICD-10-CM

## 2025-04-02 NOTE — PROGRESS NOTES
4/2/25:  Pt came in for treatment of left anterior shoulder and pec musculature.   AROM with crossover is painful   PROM with crossover is not painful    Heat  Flexion isometrics 3x30s  Rows (focus on squeezing scap) 3x15 black TB  Mid trap raises prone (thumb up) 3x8      4/1/25: pt came in for appointment to work on shoulder/roger, started with heat, then did some trigger release then ended with some cupping dragging

## 2025-04-10 ENCOUNTER — ATHLETIC TRAINING (OUTPATIENT)
Dept: SPORTS MEDICINE | Facility: OTHER | Age: 25
End: 2025-04-10

## 2025-04-10 DIAGNOSIS — M75.82 TENDINITIS OF LEFT PECTORALIS MAJOR: ICD-10-CM

## 2025-04-10 DIAGNOSIS — M75.42 IMPINGEMENT SYNDROME OF LEFT SHOULDER: ICD-10-CM

## 2025-04-10 DIAGNOSIS — M25.512 ACUTE PAIN OF LEFT SHOULDER: Primary | ICD-10-CM

## 2025-04-14 ENCOUNTER — ATHLETIC TRAINING (OUTPATIENT)
Dept: SPORTS MEDICINE | Facility: OTHER | Age: 25
End: 2025-04-14

## 2025-04-14 DIAGNOSIS — M75.42 SHOULDER IMPINGEMENT SYNDROME, LEFT: Primary | ICD-10-CM

## 2025-04-14 NOTE — PROGRESS NOTES
Athletic Training Initial Evaluation    Name: Fer Gonzalez  Age: 24 y.o.   School District: 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. Today he reports to the [facility] with a c/o left shoulder pain. He states the pain started 2 yrs ago when he felt a pop after a hit. He has been off and on rehabbing it as biceps/pectoralis tendonitis with the AT staff. He reports the pain has come and gone with it coming back 3 wks prior to today's visit.  He reports no pain while resting but a 5-8/10 sharp pain with overhead movements. He reports the pain to be primarily over his bicipital groove and that it is deep. He does report occasional numbness along his median nerve.      Objective:   Elevated left trap noted when compared bilaterally  Bilateral scapular winging noted  R scapula shows more winging when ER    Strength: WNL with pain in elevation, end range flexion and end range ER.  PROM: Abduction feels better passive than active    Special tests:  Painful arc (+)  Cross-over impingement (+)  Posterior Impingement (+)  Neers (+)  Hawkin's Will (+)  Gerbers (+)  Apleys (+) left down hurt more, right down extreme winging  Lag sign (-)  Speeds (-)  Ivette relocation (+)  Ant. Post drawer (-) for pain (+) laxity B/L  [place]'s (-)  Crank (+)  Grind (-)  AC compression (+)  Piano key (-)    Treatment Log       Date: 4/10/25   Playing Status As Tolerated   Tolerated Treatment Well       Exercise/Treatment     AAROM abduction 2x10   Median Nerve floss 3x10   Wall angels 3x10                               Fer will cont to schedule appts with AT staff  to treat his left shoulder.  CY LAT ATC

## 2025-04-14 NOTE — PROGRESS NOTES
Athletic Training Progress Note    Name: Fer Gonzalez  Age: 24 y.o.   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 Today he reports to the [facility] with a c/o left shoulder pain. He states the pain started 2 yrs ago when he felt a pop after a hit. He has been off and on rehabbing it as biceps/pectoralis tendonitis with the AT staff. He reports the pain has come and gone with it coming back 3 wks prior to today's visit.  He reports no pain while resting but a 5-8/10 sharp pain with overhead movements. He reports the pain to be primarily over his bicipital groove and that it is deep. He does report occasional numbness along his median nerve.    Objective:   Elevated left trap noted when compared bilaterally  Bilateral scapular winging noted  R scapula shows more winging when ER    Strength: WNL with pain in elevation, end range flexion and end range ER.  PROM: Abduction feels better passive than active    Special tests:  Painful arc (+)  Cross-over impingement (+)  Posterior Impingement (+)  Neers (+)  Hawkin's Will (+)  Gerbers (+)  Apleys (+) left down hurt more, right down extreme winging  Lag sign (-)  Speeds (-)  Ivette relocation (+)  Ant. Post drawer (-) for pain (+) laxity B/L  [place]'s (-)  Crank (+)  Grind (-)  AC compression (+)  Piano key (-)    Treatment Log       Date: 4/14/25 4/10/25   Playing Status As tolerated As Tolerated   Tolerated Treatment  Well        Exercise/Treatment      AAROM abduction 3x10 2x10   Median Nerve floss  3x10   Wall angels  3x10   ER at 0deg with bolster 3x10    IR at 0deg with bolster 3x10    [place] applied for impingement     Cupping 3min on left scap                   Fer will cont to  schedule appts with AT staff to treat his left shoulder.

## 2025-04-15 ENCOUNTER — ATHLETIC TRAINING (OUTPATIENT)
Dept: SPORTS MEDICINE | Facility: OTHER | Age: 25
End: 2025-04-15

## 2025-04-15 DIAGNOSIS — M75.42 SHOULDER IMPINGEMENT SYNDROME, LEFT: Primary | ICD-10-CM

## 2025-04-15 NOTE — PROGRESS NOTES
4/15/25: pt came in for appointment for shoulder impingement   Started with heat   Did WENDY with stick   Flexion 3x10   Extension 3x10  Internal/ External rotation 2x15   Ended with some stretching

## 2025-04-28 ENCOUNTER — ATHLETIC TRAINING (OUTPATIENT)
Dept: SPORTS MEDICINE | Facility: OTHER | Age: 25
End: 2025-04-28

## 2025-04-28 DIAGNOSIS — M25.512 ACUTE PAIN OF LEFT SHOULDER: ICD-10-CM

## 2025-04-28 DIAGNOSIS — M75.42 SHOULDER IMPINGEMENT SYNDROME, LEFT: Primary | ICD-10-CM

## 2025-04-30 NOTE — PROGRESS NOTES
4/28/25:  Fer reported to the [facility] today to cont treatment on his left shoulder/back. He reports doing well now that the season is over but he is sore.  Completed:  MHP 10min  Bodyblade 3x30s flexion and adduction  Cupping therapy 10min  CY LAT ATC